# Patient Record
Sex: FEMALE | Race: WHITE | Employment: OTHER | URBAN - METROPOLITAN AREA
[De-identification: names, ages, dates, MRNs, and addresses within clinical notes are randomized per-mention and may not be internally consistent; named-entity substitution may affect disease eponyms.]

---

## 2017-06-22 ENCOUNTER — HOSPITAL ENCOUNTER (OUTPATIENT)
Age: 72
Setting detail: SPECIMEN
Discharge: HOME | End: 2017-06-22
Payer: MEDICARE

## 2017-06-22 ENCOUNTER — OFFICE VISIT (OUTPATIENT)
Dept: FAMILY MEDICINE CLINIC | Age: 72
End: 2017-06-22

## 2017-06-22 VITALS
BODY MASS INDEX: 29.71 KG/M2 | OXYGEN SATURATION: 94 % | HEART RATE: 88 BPM | WEIGHT: 174 LBS | SYSTOLIC BLOOD PRESSURE: 110 MMHG | DIASTOLIC BLOOD PRESSURE: 64 MMHG | HEIGHT: 64 IN

## 2017-06-22 DIAGNOSIS — Z00.00 ANNUAL PHYSICAL EXAM: Primary | ICD-10-CM

## 2017-06-22 DIAGNOSIS — E11.9 TYPE 2 DIABETES MELLITUS WITHOUT COMPLICATION, WITHOUT LONG-TERM CURRENT USE OF INSULIN (HCC): ICD-10-CM

## 2017-06-22 DIAGNOSIS — I10 ESSENTIAL HYPERTENSION: ICD-10-CM

## 2017-06-22 LAB
CREATININE URINE: 338.8 MG/DL
HBA1C MFR BLD: 6.8 %
MICROALBUMIN UR-MCNC: 47.4 MG/DL
MICROALBUMIN/CREAT UR-RTO: 139.9 MG/G (ref 0–30)

## 2017-06-22 PROCEDURE — 82043 UR ALBUMIN QUANTITATIVE: CPT

## 2017-06-22 PROCEDURE — 82570 ASSAY OF URINE CREATININE: CPT

## 2017-06-22 PROCEDURE — 99213 OFFICE O/P EST LOW 20 MIN: CPT | Performed by: FAMILY MEDICINE

## 2017-06-22 PROCEDURE — 83036 HEMOGLOBIN GLYCOSYLATED A1C: CPT | Performed by: FAMILY MEDICINE

## 2017-06-22 RX ORDER — TRAMADOL HYDROCHLORIDE 50 MG/1
50 TABLET ORAL EVERY 6 HOURS PRN
COMMUNITY
End: 2018-11-27

## 2017-06-22 RX ORDER — CARVEDILOL 12.5 MG/1
25 TABLET ORAL 2 TIMES DAILY
Refills: 2 | COMMUNITY
Start: 2017-04-28 | End: 2019-12-13

## 2017-06-22 RX ORDER — MONTELUKAST SODIUM 4 MG/1
TABLET, CHEWABLE ORAL
COMMUNITY
Start: 2017-06-19 | End: 2021-09-01

## 2017-06-22 ASSESSMENT — ENCOUNTER SYMPTOMS
SHORTNESS OF BREATH: 0
COUGH: 0
DIARRHEA: 0
WHEEZING: 0
CONSTIPATION: 0
RHINORRHEA: 0
SORE THROAT: 0
ABDOMINAL PAIN: 0

## 2017-06-22 ASSESSMENT — PATIENT HEALTH QUESTIONNAIRE - PHQ9
SUM OF ALL RESPONSES TO PHQ9 QUESTIONS 1 & 2: 0
1. LITTLE INTEREST OR PLEASURE IN DOING THINGS: 0
2. FEELING DOWN, DEPRESSED OR HOPELESS: 0
SUM OF ALL RESPONSES TO PHQ QUESTIONS 1-9: 0

## 2017-06-23 ENCOUNTER — TELEPHONE (OUTPATIENT)
Dept: FAMILY MEDICINE CLINIC | Age: 72
End: 2017-06-23

## 2017-06-23 DIAGNOSIS — R80.9 TYPE 2 DIABETES MELLITUS WITH MICROALBUMINURIA, WITHOUT LONG-TERM CURRENT USE OF INSULIN (HCC): ICD-10-CM

## 2017-06-23 DIAGNOSIS — Z12.39 BREAST SCREENING: Primary | ICD-10-CM

## 2017-06-23 DIAGNOSIS — E11.29 TYPE 2 DIABETES MELLITUS WITH MICROALBUMINURIA, WITHOUT LONG-TERM CURRENT USE OF INSULIN (HCC): ICD-10-CM

## 2017-06-28 ENCOUNTER — OFFICE VISIT (OUTPATIENT)
Dept: FAMILY MEDICINE CLINIC | Age: 72
End: 2017-06-28

## 2017-06-28 ENCOUNTER — HOSPITAL ENCOUNTER (OUTPATIENT)
Age: 72
Setting detail: SPECIMEN
Discharge: HOME | End: 2017-06-28
Payer: COMMERCIAL

## 2017-06-28 VITALS
BODY MASS INDEX: 29.7 KG/M2 | DIASTOLIC BLOOD PRESSURE: 78 MMHG | RESPIRATION RATE: 14 BRPM | SYSTOLIC BLOOD PRESSURE: 140 MMHG | HEART RATE: 80 BPM | WEIGHT: 173 LBS

## 2017-06-28 DIAGNOSIS — R80.9 MICROALBUMINURIA: ICD-10-CM

## 2017-06-28 DIAGNOSIS — N30.00 ACUTE CYSTITIS WITHOUT HEMATURIA: Primary | ICD-10-CM

## 2017-06-28 DIAGNOSIS — N30.00 ACUTE CYSTITIS WITHOUT HEMATURIA: ICD-10-CM

## 2017-06-28 LAB
BILIRUBIN, POC: ABNORMAL
BLOOD URINE, POC: ABNORMAL
CLARITY, POC: ABNORMAL
COLOR, POC: ABNORMAL
GLUCOSE URINE, POC: ABNORMAL
KETONES, POC: ABNORMAL
LEUKOCYTE EST, POC: ABNORMAL
NITRITE, POC: ABNORMAL
PH, POC: 5.5
PROTEIN, POC: ABNORMAL
SPECIFIC GRAVITY, POC: 1.03
UROBILINOGEN, POC: ABNORMAL

## 2017-06-28 PROCEDURE — 87086 URINE CULTURE/COLONY COUNT: CPT

## 2017-06-28 PROCEDURE — 81002 URINALYSIS NONAUTO W/O SCOPE: CPT | Performed by: PHYSICIAN ASSISTANT

## 2017-06-28 PROCEDURE — 99213 OFFICE O/P EST LOW 20 MIN: CPT | Performed by: PHYSICIAN ASSISTANT

## 2017-06-28 RX ORDER — CIPROFLOXACIN 250 MG/1
250 TABLET, FILM COATED ORAL 2 TIMES DAILY
Qty: 10 TABLET | Refills: 0 | Status: SHIPPED | OUTPATIENT
Start: 2017-06-28 | End: 2017-07-03

## 2017-06-28 RX ORDER — LISINOPRIL 2.5 MG/1
2.5 TABLET ORAL DAILY
Qty: 30 TABLET | Refills: 3 | Status: SHIPPED | OUTPATIENT
Start: 2017-06-28 | End: 2017-12-06 | Stop reason: ALTCHOICE

## 2017-06-30 LAB — URINE CULTURE, ROUTINE: NORMAL

## 2017-07-10 ENCOUNTER — HOSPITAL ENCOUNTER (OUTPATIENT)
Dept: WOMENS IMAGING | Age: 72
Discharge: HOME | End: 2017-07-10
Payer: MEDICARE

## 2017-07-10 DIAGNOSIS — Z12.39 BREAST SCREENING: ICD-10-CM

## 2017-07-10 PROCEDURE — G0202 SCR MAMMO BI INCL CAD: HCPCS

## 2017-07-31 DIAGNOSIS — Z01.89 ROUTINE LAB DRAW: Primary | ICD-10-CM

## 2017-07-31 LAB
AVERAGE GLUCOSE: NORMAL
CHOLESTEROL, TOTAL: 238 MG/DL
CHOLESTEROL/HDL RATIO: NORMAL
HBA1C MFR BLD: 6.9 %
HDLC SERPL-MCNC: 39 MG/DL (ref 35–70)
LDL CHOLESTEROL CALCULATED: NORMAL MG/DL (ref 0–160)
TRIGL SERPL-MCNC: 684 MG/DL
VLDLC SERPL CALC-MCNC: NORMAL MG/DL

## 2017-08-17 ENCOUNTER — OFFICE VISIT (OUTPATIENT)
Dept: FAMILY MEDICINE CLINIC | Age: 72
End: 2017-08-17

## 2017-08-17 VITALS
OXYGEN SATURATION: 94 % | DIASTOLIC BLOOD PRESSURE: 72 MMHG | HEART RATE: 83 BPM | TEMPERATURE: 98 F | SYSTOLIC BLOOD PRESSURE: 136 MMHG | WEIGHT: 173.2 LBS | BODY MASS INDEX: 29.73 KG/M2

## 2017-08-17 DIAGNOSIS — E11.29 TYPE 2 DIABETES MELLITUS WITH MICROALBUMINURIA, WITHOUT LONG-TERM CURRENT USE OF INSULIN (HCC): Primary | ICD-10-CM

## 2017-08-17 DIAGNOSIS — R80.9 TYPE 2 DIABETES MELLITUS WITH MICROALBUMINURIA, WITHOUT LONG-TERM CURRENT USE OF INSULIN (HCC): Primary | ICD-10-CM

## 2017-08-17 PROCEDURE — 99213 OFFICE O/P EST LOW 20 MIN: CPT | Performed by: FAMILY MEDICINE

## 2017-08-25 DIAGNOSIS — Z01.89 ROUTINE LAB DRAW: Primary | ICD-10-CM

## 2017-08-25 LAB
BUN BLDV-MCNC: 22 MG/DL
CALCIUM SERPL-MCNC: 9.3 MG/DL
CHLORIDE BLD-SCNC: 99 MMOL/L
CO2: 24 MMOL/L
CREAT SERPL-MCNC: 0.88 MG/DL
GFR CALCULATED: 65
GLUCOSE BLD-MCNC: 124 MG/DL
POTASSIUM SERPL-SCNC: 4.5 MMOL/L
SODIUM BLD-SCNC: 141 MMOL/L

## 2017-08-26 ASSESSMENT — ENCOUNTER SYMPTOMS
ABDOMINAL PAIN: 0
COUGH: 0
SORE THROAT: 0
WHEEZING: 0
DIARRHEA: 0
SHORTNESS OF BREATH: 0
CONSTIPATION: 0
RHINORRHEA: 0

## 2017-09-27 ENCOUNTER — NURSE ONLY (OUTPATIENT)
Dept: FAMILY MEDICINE CLINIC | Age: 72
End: 2017-09-27

## 2017-09-27 DIAGNOSIS — Z23 NEED FOR INFLUENZA VACCINATION: Primary | ICD-10-CM

## 2017-09-27 PROCEDURE — G0008 ADMIN INFLUENZA VIRUS VAC: HCPCS | Performed by: FAMILY MEDICINE

## 2017-09-27 PROCEDURE — 90662 IIV NO PRSV INCREASED AG IM: CPT | Performed by: FAMILY MEDICINE

## 2017-11-06 DIAGNOSIS — F51.01 PRIMARY INSOMNIA: ICD-10-CM

## 2017-11-07 RX ORDER — TRAZODONE HYDROCHLORIDE 100 MG/1
TABLET ORAL
Qty: 30 TABLET | Refills: 11 | Status: SHIPPED | OUTPATIENT
Start: 2017-11-07 | End: 2018-12-08 | Stop reason: SDUPTHER

## 2017-12-06 ENCOUNTER — OFFICE VISIT (OUTPATIENT)
Dept: FAMILY MEDICINE CLINIC | Age: 72
End: 2017-12-06

## 2017-12-06 ENCOUNTER — HOSPITAL ENCOUNTER (OUTPATIENT)
Age: 72
Setting detail: SPECIMEN
Discharge: HOME | End: 2017-12-06
Payer: MEDICARE

## 2017-12-06 ENCOUNTER — TELEPHONE (OUTPATIENT)
Dept: FAMILY MEDICINE CLINIC | Age: 72
End: 2017-12-06

## 2017-12-06 VITALS
BODY MASS INDEX: 30.9 KG/M2 | TEMPERATURE: 97.8 F | HEART RATE: 80 BPM | SYSTOLIC BLOOD PRESSURE: 134 MMHG | WEIGHT: 180 LBS | DIASTOLIC BLOOD PRESSURE: 74 MMHG | OXYGEN SATURATION: 94 %

## 2017-12-06 DIAGNOSIS — E11.29 TYPE 2 DIABETES MELLITUS WITH MICROALBUMINURIA, WITHOUT LONG-TERM CURRENT USE OF INSULIN (HCC): Primary | ICD-10-CM

## 2017-12-06 DIAGNOSIS — E11.29 TYPE 2 DIABETES MELLITUS WITH MICROALBUMINURIA, WITHOUT LONG-TERM CURRENT USE OF INSULIN (HCC): ICD-10-CM

## 2017-12-06 DIAGNOSIS — R80.9 TYPE 2 DIABETES MELLITUS WITH MICROALBUMINURIA, WITHOUT LONG-TERM CURRENT USE OF INSULIN (HCC): ICD-10-CM

## 2017-12-06 DIAGNOSIS — I10 ESSENTIAL HYPERTENSION: ICD-10-CM

## 2017-12-06 DIAGNOSIS — R80.9 TYPE 2 DIABETES MELLITUS WITH MICROALBUMINURIA, WITHOUT LONG-TERM CURRENT USE OF INSULIN (HCC): Primary | ICD-10-CM

## 2017-12-06 LAB
CREATININE URINE: 278.1 MG/DL
HBA1C MFR BLD: 6.5 %
MICROALBUMIN UR-MCNC: 73 MG/DL
MICROALBUMIN/CREAT UR-RTO: 262.5 MG/G (ref 0–30)

## 2017-12-06 PROCEDURE — 82570 ASSAY OF URINE CREATININE: CPT

## 2017-12-06 PROCEDURE — 83036 HEMOGLOBIN GLYCOSYLATED A1C: CPT | Performed by: FAMILY MEDICINE

## 2017-12-06 PROCEDURE — 99213 OFFICE O/P EST LOW 20 MIN: CPT | Performed by: FAMILY MEDICINE

## 2017-12-06 PROCEDURE — 82043 UR ALBUMIN QUANTITATIVE: CPT

## 2017-12-06 RX ORDER — LISINOPRIL 20 MG/1
TABLET ORAL
Refills: 3 | COMMUNITY
Start: 2017-11-07 | End: 2022-03-21 | Stop reason: DRUGHIGH

## 2017-12-06 RX ORDER — GLUCOSAMINE HCL/CHONDROITIN SU 500-400 MG
CAPSULE ORAL
Qty: 100 STRIP | Refills: 3 | Status: SHIPPED | OUTPATIENT
Start: 2017-12-06 | End: 2020-03-10 | Stop reason: SDUPTHER

## 2017-12-06 RX ORDER — SYRING-NEEDL,DISP,INSUL,0.3 ML 30 GX5/16"
1 SYRINGE, EMPTY DISPOSABLE MISCELLANEOUS 3 TIMES DAILY
Qty: 100 EACH | Refills: 5 | Status: SHIPPED | OUTPATIENT
Start: 2017-12-06

## 2017-12-06 RX ORDER — LANCETS 30 GAUGE
EACH MISCELLANEOUS
Qty: 100 EACH | Refills: 3 | Status: SHIPPED | OUTPATIENT
Start: 2017-12-06 | End: 2021-03-26

## 2017-12-06 ASSESSMENT — ENCOUNTER SYMPTOMS
WHEEZING: 0
CONSTIPATION: 0
SORE THROAT: 0
RHINORRHEA: 0
DIARRHEA: 0
COUGH: 0
ABDOMINAL PAIN: 0
SHORTNESS OF BREATH: 0

## 2017-12-06 NOTE — PROGRESS NOTES
[address] [place] [address] PRIMARY CARE [address] [id number]  Dept: [phone]  Dept Fax: [phone]  Loc: [phone]   Chief Complaint  Chief Complaint   Patient presents with    Diabetes     follow up, pt would like to have another micro done, last one was 06/2017       HPI:  74 y.o. female who presents for DM2:    DM2: a1c 6.5 from 6.9. Compliant with diet. Diet controlled diabetes. HTN: was taken off the amlodipine due to foot swelling and meds being adjusted by her cardiologist.     Past Medical History:   Diagnosis Date    Acid reflux     Arthritis     Hyperlipidemia     Hypertension     Spinal stenosis     Type II or unspecified type diabetes mellitus without mention of complication, not stated as uncontrolled      Past Surgical History:   Procedure Laterality Date    CHOLECYSTECTOMY      COLON SURGERY  2010    bowel reduction    EYE SURGERY Right     laser surgery tore retina     KNEE SURGERY Right 09/2013    torn tyresecus & baker cyst burst    LUMBAR LAMINECTOMY       Social History     Social History    Marital status:      Spouse name: N/A    Number of children: N/A    Years of education: N/A     Occupational History    Not on file. Social History Main Topics    Smoking status: Never Smoker    Smokeless tobacco: Never Used    Alcohol use Yes      Comment: rare    Drug use: No    Sexual activity: Yes     Other Topics Concern    Not on file     Social History Narrative    No narrative on file     Allergies   Allergen Reactions    Amoxicillin-Pot Clavulanate     Flagyl [Metronidazole]     Sulfa Antibiotics      Current Outpatient Prescriptions   Medication Sig Dispense Refill    lisinopril (PRINIVIL;ZESTRIL) 20 MG tablet take 1 tablet by mouth twice daily  3    Lancet Device MISC 1 Device by Does not apply route 3 times daily Test 4x/day.  100 each 5    Blood Glucose Monitoring Suppl DONTRELL

## 2018-03-15 ENCOUNTER — TELEPHONE (OUTPATIENT)
Dept: FAMILY MEDICINE CLINIC | Age: 73
End: 2018-03-15

## 2018-03-15 ENCOUNTER — OFFICE VISIT (OUTPATIENT)
Dept: FAMILY MEDICINE CLINIC | Age: 73
End: 2018-03-15
Payer: MEDICARE

## 2018-03-15 VITALS
BODY MASS INDEX: 31.1 KG/M2 | TEMPERATURE: 98.3 F | SYSTOLIC BLOOD PRESSURE: 136 MMHG | HEART RATE: 75 BPM | DIASTOLIC BLOOD PRESSURE: 78 MMHG | OXYGEN SATURATION: 96 % | HEIGHT: 64 IN | WEIGHT: 182.2 LBS

## 2018-03-15 DIAGNOSIS — E11.29 TYPE 2 DIABETES MELLITUS WITH MICROALBUMINURIA, WITHOUT LONG-TERM CURRENT USE OF INSULIN (HCC): ICD-10-CM

## 2018-03-15 DIAGNOSIS — R39.15 URGENCY OF URINATION: ICD-10-CM

## 2018-03-15 DIAGNOSIS — R80.9 TYPE 2 DIABETES MELLITUS WITH MICROALBUMINURIA, WITHOUT LONG-TERM CURRENT USE OF INSULIN (HCC): ICD-10-CM

## 2018-03-15 DIAGNOSIS — H57.89 EYE SWELLING, RIGHT: Primary | ICD-10-CM

## 2018-03-15 DIAGNOSIS — I10 ESSENTIAL HYPERTENSION: ICD-10-CM

## 2018-03-15 LAB
APPEARANCE FLUID: ABNORMAL
BILIRUBIN, POC: ABNORMAL
BLOOD URINE, POC: ABNORMAL
CLARITY, POC: ABNORMAL
COLOR, POC: YELLOW
GLUCOSE URINE, POC: ABNORMAL
KETONES, POC: ABNORMAL
LEUKOCYTE EST, POC: ABNORMAL
NITRITE, POC: ABNORMAL
PH, POC: 5.5
PROTEIN, POC: ABNORMAL
SPECIFIC GRAVITY, POC: 1.03
UROBILINOGEN, POC: ABNORMAL

## 2018-03-15 PROCEDURE — 99214 OFFICE O/P EST MOD 30 MIN: CPT | Performed by: FAMILY MEDICINE

## 2018-03-15 RX ORDER — AMLODIPINE BESYLATE 5 MG/1
TABLET ORAL
Refills: 3 | COMMUNITY
Start: 2018-03-01 | End: 2018-03-15 | Stop reason: DRUGHIGH

## 2018-03-15 RX ORDER — NITROFURANTOIN 25; 75 MG/1; MG/1
100 CAPSULE ORAL 2 TIMES DAILY
Qty: 7 CAPSULE | Refills: 0 | Status: SHIPPED | OUTPATIENT
Start: 2018-03-15 | End: 2018-06-16 | Stop reason: SDUPTHER

## 2018-03-15 ASSESSMENT — ENCOUNTER SYMPTOMS
CONSTIPATION: 0
ABDOMINAL PAIN: 0
SHORTNESS OF BREATH: 0
COUGH: 0
DIARRHEA: 0
RHINORRHEA: 0
WHEEZING: 0
SORE THROAT: 0

## 2018-03-15 NOTE — PROGRESS NOTES
on file     Social History Narrative    No narrative on file     Allergies   Allergen Reactions    Amoxicillin-Pot Clavulanate     Flagyl [Metronidazole]     Sulfa Antibiotics      Current Outpatient Prescriptions   Medication Sig Dispense Refill    nitrofurantoin, macrocrystal-monohydrate, (MACROBID) 100 MG capsule Take 1 capsule by mouth 2 times daily for 7 days 7 capsule 0    lisinopril (PRINIVIL;ZESTRIL) 20 MG tablet take 1 tablet by mouth twice daily  3    Lancet Device MISC 1 Device by Does not apply route 3 times daily Test 4x/day. 100 each 5    Blood Glucose Monitoring Suppl DONTRELL Use TID; also needs supplies for machine 1 Device 0    Lancets MISC  each 3    Glucose Blood (BLOOD GLUCOSE TEST STRIPS) STRP  strip 3    traZODone (DESYREL) 100 MG tablet Take 1 tablet by mouth nightly 30 tablet 11    carvedilol (COREG) 12.5 MG tablet 25 mg 2 times daily   2    colestipol (COLESTID) 1 g tablet       traMADol (ULTRAM) 50 MG tablet Take 50 mg by mouth every 6 hours as needed for Pain      omeprazole (PRILOSEC) 20 MG capsule Take 20 mg by mouth daily.  CO ENZYME Q-10 PO Take  by mouth.  Cyanocobalamin (VITAMIN B-12 CR) 1000 MCG TBCR Take  by mouth.  amLODIPine (NORVASC) 10 MG tablet Take 10 mg by mouth 2 times daily       aspirin 81 MG tablet Take 81 mg by mouth daily.  Calcium Carbonate-Vitamin D (CALCIUM + D) 600-200 MG-UNIT TABS Take  by mouth.  pravastatin (PRAVACHOL) 40 MG tablet Take 40 mg by mouth daily. No current facility-administered medications for this visit. ROS:  Review of Systems   Constitutional: Negative for chills and fever. HENT: Negative for rhinorrhea and sore throat. Respiratory: Negative for cough, shortness of breath and wheezing. Gastrointestinal: Negative for abdominal pain, constipation and diarrhea. Endocrine: Negative for polydipsia and polyuria. Genitourinary: Negative for dysuria, frequency and urgency. continue the antihistamine eye drops. - HTN: at goal  - Urine urgency: UA suggestive of UTI; macrobid    1. Eye swelling, right     2. Urgency of urination  POCT Urinalysis no Micro    nitrofurantoin, macrocrystal-monohydrate, (MACROBID) 100 MG capsule   3. Type 2 diabetes mellitus with microalbuminuria, without long-term current use of insulin ([address]     4. Essential hypertension          Return if symptoms worsen or fail to improve.     Thea Beck MD

## 2018-03-21 ENCOUNTER — TELEPHONE (OUTPATIENT)
Dept: FAMILY MEDICINE CLINIC | Age: 73
End: 2018-03-21

## 2018-03-21 RX ORDER — METHYLPREDNISOLONE 4 MG/1
TABLET ORAL
Qty: 1 KIT | Refills: 0 | Status: SHIPPED | OUTPATIENT
Start: 2018-03-21 | End: 2018-03-27

## 2018-06-08 ENCOUNTER — TELEPHONE (OUTPATIENT)
Dept: FAMILY MEDICINE CLINIC | Age: 73
End: 2018-06-08

## 2018-06-08 DIAGNOSIS — R80.9 TYPE 2 DIABETES MELLITUS WITH MICROALBUMINURIA, WITHOUT LONG-TERM CURRENT USE OF INSULIN (HCC): Primary | ICD-10-CM

## 2018-06-08 DIAGNOSIS — E11.29 TYPE 2 DIABETES MELLITUS WITH MICROALBUMINURIA, WITHOUT LONG-TERM CURRENT USE OF INSULIN (HCC): Primary | ICD-10-CM

## 2018-06-08 LAB
AVERAGE GLUCOSE: 157
HBA1C MFR BLD: 7.1 %

## 2018-06-16 ENCOUNTER — HOSPITAL ENCOUNTER (OUTPATIENT)
Age: 73
Setting detail: SPECIMEN
Discharge: HOME | End: 2018-06-16
Payer: MEDICARE

## 2018-06-16 ENCOUNTER — OFFICE VISIT (OUTPATIENT)
Dept: FAMILY MEDICINE CLINIC | Age: 73
End: 2018-06-16
Payer: MEDICARE

## 2018-06-16 VITALS
TEMPERATURE: 98.1 F | HEART RATE: 90 BPM | BODY MASS INDEX: 30.56 KG/M2 | WEIGHT: 179 LBS | SYSTOLIC BLOOD PRESSURE: 130 MMHG | OXYGEN SATURATION: 96 % | HEIGHT: 64 IN | DIASTOLIC BLOOD PRESSURE: 77 MMHG | RESPIRATION RATE: 16 BRPM

## 2018-06-16 DIAGNOSIS — R30.0 DYSURIA: ICD-10-CM

## 2018-06-16 DIAGNOSIS — N30.00 ACUTE CYSTITIS WITHOUT HEMATURIA: Primary | ICD-10-CM

## 2018-06-16 LAB
BILIRUBIN, POC: ABNORMAL
BLOOD URINE, POC: ABNORMAL
CLARITY, POC: ABNORMAL
COLOR, POC: YELLOW
GLUCOSE URINE, POC: ABNORMAL
KETONES, POC: ABNORMAL
LEUKOCYTE EST, POC: ABNORMAL
NITRITE, POC: ABNORMAL
PH, POC: 6
PROTEIN, POC: ABNORMAL
SPECIFIC GRAVITY, POC: 1.03
UROBILINOGEN, POC: 3.5

## 2018-06-16 PROCEDURE — 87077 CULTURE AEROBIC IDENTIFY: CPT

## 2018-06-16 PROCEDURE — 87186 SC STD MICRODIL/AGAR DIL: CPT

## 2018-06-16 PROCEDURE — 81003 URINALYSIS AUTO W/O SCOPE: CPT | Performed by: NURSE PRACTITIONER

## 2018-06-16 PROCEDURE — 99213 OFFICE O/P EST LOW 20 MIN: CPT | Performed by: NURSE PRACTITIONER

## 2018-06-16 PROCEDURE — 87086 URINE CULTURE/COLONY COUNT: CPT

## 2018-06-16 RX ORDER — NITROFURANTOIN 25; 75 MG/1; MG/1
100 CAPSULE ORAL 2 TIMES DAILY
Qty: 7 CAPSULE | Refills: 0 | Status: SHIPPED | OUTPATIENT
Start: 2018-06-16 | End: 2018-06-23

## 2018-06-16 ASSESSMENT — ENCOUNTER SYMPTOMS
SHORTNESS OF BREATH: 0
WHEEZING: 0
VOMITING: 0
COUGH: 0
DIARRHEA: 0
NAUSEA: 0
ABDOMINAL PAIN: 0

## 2018-06-18 LAB
ORGANISM: ABNORMAL
URINE CULTURE, ROUTINE: ABNORMAL
URINE CULTURE, ROUTINE: ABNORMAL

## 2018-06-22 ENCOUNTER — TELEPHONE (OUTPATIENT)
Dept: INTERNAL MEDICINE CLINIC | Age: 73
End: 2018-06-22

## 2018-07-13 ENCOUNTER — OFFICE VISIT (OUTPATIENT)
Dept: FAMILY MEDICINE CLINIC | Age: 73
End: 2018-07-13
Payer: MEDICARE

## 2018-07-13 VITALS
BODY MASS INDEX: 30.04 KG/M2 | SYSTOLIC BLOOD PRESSURE: 132 MMHG | OXYGEN SATURATION: 96 % | DIASTOLIC BLOOD PRESSURE: 72 MMHG | WEIGHT: 175 LBS | HEART RATE: 70 BPM

## 2018-07-13 DIAGNOSIS — R53.83 FATIGUE, UNSPECIFIED TYPE: ICD-10-CM

## 2018-07-13 DIAGNOSIS — Z91.81 AT HIGH RISK FOR FALLS: ICD-10-CM

## 2018-07-13 DIAGNOSIS — R42 DIZZINESS: Primary | ICD-10-CM

## 2018-07-13 PROCEDURE — 99213 OFFICE O/P EST LOW 20 MIN: CPT | Performed by: FAMILY MEDICINE

## 2018-07-13 ASSESSMENT — ENCOUNTER SYMPTOMS
DIARRHEA: 0
WHEEZING: 0
RHINORRHEA: 0
SHORTNESS OF BREATH: 0
CONSTIPATION: 0
SORE THROAT: 0
ABDOMINAL PAIN: 0
COUGH: 0

## 2018-07-13 ASSESSMENT — PATIENT HEALTH QUESTIONNAIRE - PHQ9
SUM OF ALL RESPONSES TO PHQ QUESTIONS 1-9: 0
2. FEELING DOWN, DEPRESSED OR HOPELESS: 0
1. LITTLE INTEREST OR PLEASURE IN DOING THINGS: 0
SUM OF ALL RESPONSES TO PHQ9 QUESTIONS 1 & 2: 0

## 2018-07-13 NOTE — PROGRESS NOTES
[address] [facility] [address] [facility] PRIMARY CARE  [facility] [place] [address]  Dept: [phone]  Dept Fax: : [phone]   Chief Complaint  Chief Complaint   Patient presents with    Dizziness     x2weeks    Other     Feels \"Clamy\"    [facility]Up from [facility]        HPI:  74 y.o. female who presents for dizziness:    LUE pain: tore rotator cuff and had shulder fracture; just had surgery 6/11; this was following a fall onto concrete while watering plants; Wearing a sling and going to rehab. Seen in ED last Saturday after feeling \"clammy\"; In the ED had CT head and CXR with no acute changes. No ACS event found; Seen by her cardiologist 7/11 with plans for labs, echo, stress test, holter. Her carvedilol was also increased to 37.5 BID. Was told she was dehydrated. Initially had felt weak, dizzy; no CP or SOB or nausea. No visual changes. Since that time has felt sleepy, fatigue, nausea, decreased appetite. She is no longer taking percocet for the LUE pain. Had IBS with intermittent diarrhea. Past Medical History:   Diagnosis Date    Acid reflux     Arthritis     Hyperlipidemia     Hypertension     Spinal stenosis     Type II or unspecified type diabetes mellitus without mention of complication, not stated as uncontrolled      Past Surgical History:   Procedure Laterality Date    CHOLECYSTECTOMY      COLON SURGERY  2010    bowel reduction    EYE SURGERY Right     laser surgery tore retina     KNEE SURGERY Right 09/2013    torn menescus & baker cyst burst    LUMBAR LAMINECTOMY       Social History     Social History    Marital status:      Spouse name: N/A    Number of children: N/A    Years of education: N/A     Occupational History    Not on file.      Social History Main Topics    Smoking status: Never Smoker    Smokeless tobacco: Never Used    Alcohol use Yes Neurological: Positive for dizziness. Negative for syncope, light-headedness, numbness and headaches. Psychiatric/Behavioral: Negative for sleep disturbance. The patient is not nervous/anxious. Vitals:    07/13/18 1054   BP: 132/72   Site: Right Arm   Position: Sitting   Cuff Size: Medium Adult   Pulse: 70   SpO2: 96%   Weight: 175 lb (79.4 kg)       Physical exam:  Physical Exam   Constitutional: She is oriented to person, place, and time. She appears well-developed and well-nourished. No distress. HENT:   Head: Normocephalic and atraumatic. Right Ear: Tympanic membrane, external ear and ear canal normal. Tympanic membrane is not erythematous. Tympanic membrane mobility is normal.   Left Ear: Tympanic membrane, external ear and ear canal normal. Tympanic membrane is not erythematous. Tympanic membrane mobility is normal.   Nose: Nose normal.   Mouth/Throat: Oropharynx is clear and moist. No oropharyngeal exudate. Eyes: EOM are normal.   Neck: Normal range of motion. No thyromegaly present. Cardiovascular: Normal rate, regular rhythm and normal heart sounds. No murmur heard. Pulmonary/Chest: Effort normal and breath sounds normal. No respiratory distress. She has no wheezes. Abdominal: Soft. Bowel sounds are normal. She exhibits no distension. There is no tenderness. There is no rebound and no guarding. Musculoskeletal: She exhibits no edema. Lymphadenopathy:     She has no cervical adenopathy. Neurological: She is alert and oriented to person, place, and time. Skin: Skin is warm and dry. Psychiatric: She has a normal mood and affect. Her behavior is normal.   Vitals reviewed. Assessment/Plan:  74 y.o. female here mainly for dizziness:  - benign exam; will need to complete her w/u with cardiology; Can discuss again later if symptoms persist. DM2 has been well controlled. For now, encouraged hydration. Diagnosis Orders   1. Dizziness     2. At high risk for falls     3. Fatigue, unspecified type          Return if symptoms worsen or fail to improve. Jagdish Hall MD     On the basis of positive falls risk screening, assessment and plan is as follows: work up already in progress.

## 2018-09-27 ENCOUNTER — OFFICE VISIT (OUTPATIENT)
Dept: FAMILY MEDICINE CLINIC | Age: 73
End: 2018-09-27
Payer: MEDICARE

## 2018-09-27 VITALS
OXYGEN SATURATION: 97 % | HEART RATE: 78 BPM | HEIGHT: 63 IN | TEMPERATURE: 98.5 F | WEIGHT: 178.6 LBS | DIASTOLIC BLOOD PRESSURE: 70 MMHG | BODY MASS INDEX: 31.64 KG/M2 | SYSTOLIC BLOOD PRESSURE: 122 MMHG

## 2018-09-27 DIAGNOSIS — E11.29 TYPE 2 DIABETES MELLITUS WITH MICROALBUMINURIA, WITHOUT LONG-TERM CURRENT USE OF INSULIN (HCC): ICD-10-CM

## 2018-09-27 DIAGNOSIS — Z23 ENCOUNTER FOR VACCINATION: ICD-10-CM

## 2018-09-27 DIAGNOSIS — Z23 NEED FOR INFLUENZA VACCINATION: ICD-10-CM

## 2018-09-27 DIAGNOSIS — R80.9 TYPE 2 DIABETES MELLITUS WITH MICROALBUMINURIA, WITHOUT LONG-TERM CURRENT USE OF INSULIN (HCC): ICD-10-CM

## 2018-09-27 DIAGNOSIS — Z00.00 ROUTINE GENERAL MEDICAL EXAMINATION AT A HEALTH CARE FACILITY: Primary | ICD-10-CM

## 2018-09-27 LAB — HBA1C MFR BLD: 6.9 %

## 2018-09-27 PROCEDURE — 83036 HEMOGLOBIN GLYCOSYLATED A1C: CPT | Performed by: FAMILY MEDICINE

## 2018-09-27 PROCEDURE — G0008 ADMIN INFLUENZA VIRUS VAC: HCPCS | Performed by: FAMILY MEDICINE

## 2018-09-27 PROCEDURE — 90662 IIV NO PRSV INCREASED AG IM: CPT | Performed by: FAMILY MEDICINE

## 2018-09-27 PROCEDURE — G0438 PPPS, INITIAL VISIT: HCPCS | Performed by: FAMILY MEDICINE

## 2018-09-27 RX ORDER — ACETAMINOPHEN,DIPHENHYDRAMINE HCL 500; 25 MG/1; MG/1
1 TABLET, FILM COATED ORAL
COMMUNITY
End: 2021-09-01 | Stop reason: ALTCHOICE

## 2018-09-27 ASSESSMENT — LIFESTYLE VARIABLES
HOW OFTEN DURING THE LAST YEAR HAVE YOU HAD A FEELING OF GUILT OR REMORSE AFTER DRINKING: 0
HOW MANY STANDARD DRINKS CONTAINING ALCOHOL DO YOU HAVE ON A TYPICAL DAY: 0
AUDIT TOTAL SCORE: 2
AUDIT-C TOTAL SCORE: 2
HOW OFTEN DURING THE LAST YEAR HAVE YOU BEEN UNABLE TO REMEMBER WHAT HAPPENED THE NIGHT BEFORE BECAUSE YOU HAD BEEN DRINKING: 0
HOW OFTEN DURING THE LAST YEAR HAVE YOU FOUND THAT YOU WERE NOT ABLE TO STOP DRINKING ONCE YOU HAD STARTED: 0
HOW OFTEN DURING THE LAST YEAR HAVE YOU FAILED TO DO WHAT WAS NORMALLY EXPECTED FROM YOU BECAUSE OF DRINKING: 0
HAVE YOU OR SOMEONE ELSE BEEN INJURED AS A RESULT OF YOUR DRINKING: 0
HAS A RELATIVE, FRIEND, DOCTOR, OR ANOTHER HEALTH PROFESSIONAL EXPRESSED CONCERN ABOUT YOUR DRINKING OR SUGGESTED YOU CUT DOWN: 0
HOW OFTEN DO YOU HAVE SIX OR MORE DRINKS ON ONE OCCASION: 0
HOW OFTEN DO YOU HAVE A DRINK CONTAINING ALCOHOL: 2
HOW OFTEN DURING THE LAST YEAR HAVE YOU NEEDED AN ALCOHOLIC DRINK FIRST THING IN THE MORNING TO GET YOURSELF GOING AFTER A NIGHT OF HEAVY DRINKING: 0

## 2018-09-27 ASSESSMENT — PATIENT HEALTH QUESTIONNAIRE - PHQ9
SUM OF ALL RESPONSES TO PHQ QUESTIONS 1-9: 0
SUM OF ALL RESPONSES TO PHQ QUESTIONS 1-9: 0

## 2018-09-27 ASSESSMENT — ANXIETY QUESTIONNAIRES: GAD7 TOTAL SCORE: 0

## 2018-09-27 NOTE — PROGRESS NOTES
Medicare Annual Wellness Visit  Name: Geena Chandler Date: 2018   MRN: [id number] Sex: Female   Age: 74 y.o. Ethnicity: Non-/Non    : 1945 Race: Yordy Jennings is here for Medicare AWV; Diabetes (follow up); and Immunizations (discuss flu vaccine, shingles vaccine )    Screenings for behavioral, psychosocial and functional/safety risks, and cognitive dysfunction are all negative except as indicated below. These results, as well as other patient data from the [address] form, are documented in Flowsheets linked to this Encounter. Allergies   Allergen Reactions    Augmentin [Amoxicillin-Pot Clavulanate] Nausea And Vomiting    Flagyl [Metronidazole]     Sulfa Antibiotics      Prior to Visit Medications    Medication Sig Taking? Authorizing Provider   lisinopril (PRINIVIL;ZESTRIL) 20 MG tablet take 1 tablet by mouth twice daily Yes Historical Provider, MD   Lancet Device MISC 1 Device by Does not apply route 3 times daily Test 4x/day. Yes Mili Finney MD   Blood Glucose Monitoring Suppl DONTRELL Use TID; also needs supplies for machine Yes Mili Finney MD   Lancets MISC TID Yes Mili Finney MD   Glucose Blood (BLOOD GLUCOSE TEST STRIPS) STRP TID Yes Mili Finney MD   traZODone (DESYREL) 100 MG tablet Take 1 tablet by mouth nightly  Patient taking differently: Take 0.5 tablet by mouth nightly Yes Mili Finney MD   carvedilol (COREG) 12.5 MG tablet 25 mg 2 times daily Taking 0.5 in the am and 1 tablet in the pm Yes Historical Provider, MD   traMADol (ULTRAM) 50 MG tablet Take 50 mg by mouth every 6 hours as needed for Pain Yes Historical Provider, MD   omeprazole (PRILOSEC) 20 MG capsule Take 20 mg by mouth daily. Yes Historical Provider, MD   CO ENZYME Q-10 PO Take  by mouth. Yes Historical Provider, MD   Cyanocobalamin (VITAMIN B-12 CR) 1000 MCG TBCR Take  by mouth.  Yes Historical Provider, MD   amLODIPine (NORVASC) 10 MG tablet Take 10 mg by mouth  Lipid screen  07/28/2018    Potassium monitoring  08/24/2018    Creatinine monitoring  08/24/2018    Flu vaccine (1) 09/01/2018    A1C test (Diabetic or Prediabetic)  06/07/2019    Breast cancer screen  07/10/2019    Colon cancer screen colonoscopy  05/22/2023    DEXA (modify frequency per FRAX score)  Addressed    Pneumococcal low/med risk  Completed     Recommendations for Preventive Services Due: see orders and patient instructions/AVS.  .   Recommended screening schedule for the next 5-10 years is provided to the patient in written form: see Patient Instructions/AVS.

## 2018-10-04 ENCOUNTER — HOSPITAL ENCOUNTER (OUTPATIENT)
Age: 73
Setting detail: SPECIMEN
Discharge: HOME | End: 2018-10-04
Payer: MEDICARE

## 2018-10-04 ENCOUNTER — NURSE ONLY (OUTPATIENT)
Dept: FAMILY MEDICINE CLINIC | Age: 73
End: 2018-10-04
Payer: MEDICARE

## 2018-10-04 DIAGNOSIS — R80.9 TYPE 2 DIABETES MELLITUS WITH MICROALBUMINURIA, WITHOUT LONG-TERM CURRENT USE OF INSULIN (HCC): Primary | ICD-10-CM

## 2018-10-04 DIAGNOSIS — Z00.00 ROUTINE GENERAL MEDICAL EXAMINATION AT A HEALTH CARE FACILITY: ICD-10-CM

## 2018-10-04 DIAGNOSIS — E11.29 TYPE 2 DIABETES MELLITUS WITH MICROALBUMINURIA, WITHOUT LONG-TERM CURRENT USE OF INSULIN (HCC): Primary | ICD-10-CM

## 2018-10-04 LAB
ANION GAP SERPL CALCULATED.3IONS-SCNC: 17 MEQ/L (ref 7–13)
BUN BLDV-MCNC: 21 MG/DL (ref 8–23)
CALCIUM SERPL-MCNC: 9.4 MG/DL (ref 8.6–10.2)
CHLORIDE BLD-SCNC: 99 MEQ/L (ref 98–107)
CHOLESTEROL, TOTAL: 201 MG/DL (ref 0–199)
CO2: 25 MEQ/L (ref 22–29)
CREAT SERPL-MCNC: 0.84 MG/DL (ref 0.5–0.9)
GFR AFRICAN AMERICAN: >60
GFR NON-AFRICAN AMERICAN: >60
GLUCOSE BLD-MCNC: 108 MG/DL (ref 74–109)
HDLC SERPL-MCNC: 45 MG/DL (ref 40–59)
LDL CHOLESTEROL CALCULATED: 90 MG/DL (ref 0–129)
POTASSIUM SERPL-SCNC: 4.4 MEQ/L (ref 3.5–5.1)
SODIUM BLD-SCNC: 141 MEQ/L (ref 132–144)
TRIGL SERPL-MCNC: 332 MG/DL (ref 0–200)

## 2018-10-04 PROCEDURE — 80061 LIPID PANEL: CPT

## 2018-10-04 PROCEDURE — 36415 COLL VENOUS BLD VENIPUNCTURE: CPT | Performed by: FAMILY MEDICINE

## 2018-10-04 PROCEDURE — 80048 BASIC METABOLIC PNL TOTAL CA: CPT

## 2018-11-27 ENCOUNTER — OFFICE VISIT (OUTPATIENT)
Dept: FAMILY MEDICINE CLINIC | Age: 73
End: 2018-11-27
Payer: MEDICARE

## 2018-11-27 ENCOUNTER — HOSPITAL ENCOUNTER (OUTPATIENT)
Age: 73
Setting detail: SPECIMEN
Discharge: HOME | End: 2018-11-27
Payer: MEDICARE

## 2018-11-27 VITALS
HEART RATE: 65 BPM | OXYGEN SATURATION: 98 % | WEIGHT: 181 LBS | BODY MASS INDEX: 30.9 KG/M2 | HEIGHT: 64 IN | DIASTOLIC BLOOD PRESSURE: 60 MMHG | SYSTOLIC BLOOD PRESSURE: 104 MMHG

## 2018-11-27 DIAGNOSIS — R80.9 TYPE 2 DIABETES MELLITUS WITH MICROALBUMINURIA, WITHOUT LONG-TERM CURRENT USE OF INSULIN (HCC): ICD-10-CM

## 2018-11-27 DIAGNOSIS — E11.29 TYPE 2 DIABETES MELLITUS WITH MICROALBUMINURIA, WITHOUT LONG-TERM CURRENT USE OF INSULIN (HCC): ICD-10-CM

## 2018-11-27 DIAGNOSIS — M48.00 SPINAL STENOSIS, UNSPECIFIED SPINAL REGION: Primary | ICD-10-CM

## 2018-11-27 DIAGNOSIS — Z23 ENCOUNTER FOR VACCINATION: ICD-10-CM

## 2018-11-27 LAB
CREATININE URINE: 537.4 MG/DL
MICROALBUMIN UR-MCNC: 39 MG/DL
MICROALBUMIN/CREAT UR-RTO: 72.6 MG/G (ref 0–30)

## 2018-11-27 PROCEDURE — 99214 OFFICE O/P EST MOD 30 MIN: CPT | Performed by: FAMILY MEDICINE

## 2018-11-27 PROCEDURE — 82570 ASSAY OF URINE CREATININE: CPT

## 2018-11-27 PROCEDURE — 82043 UR ALBUMIN QUANTITATIVE: CPT

## 2018-11-27 RX ORDER — TRAMADOL HYDROCHLORIDE 50 MG/1
50 TABLET ORAL DAILY PRN
Qty: 30 TABLET | Refills: 0 | Status: SHIPPED | OUTPATIENT
Start: 2018-11-27 | End: 2018-12-27

## 2018-11-27 RX ORDER — AMLODIPINE BESYLATE 5 MG/1
5 TABLET ORAL DAILY
Refills: 3 | COMMUNITY
Start: 2018-10-29 | End: 2021-10-14 | Stop reason: SDUPTHER

## 2018-11-27 ASSESSMENT — ENCOUNTER SYMPTOMS
WHEEZING: 0
CONSTIPATION: 0
DIARRHEA: 0
RHINORRHEA: 0
BACK PAIN: 1
ABDOMINAL PAIN: 0
SORE THROAT: 0
SHORTNESS OF BREATH: 0
COUGH: 0

## 2018-11-27 NOTE — PROGRESS NOTES
arthralgias and back pain. Neurological: Negative for syncope, light-headedness, numbness and headaches. Psychiatric/Behavioral: Negative for sleep disturbance. The patient is not nervous/anxious. Vitals:    11/27/18 1018   BP: 104/60   Pulse: 65   SpO2: 98%   Weight: 181 lb (82.1 kg)   Height: 5' 4\" (1.626 m)       Physical exam:  Physical Exam   Constitutional: She is oriented to person, place, and time. She appears well-developed and well-nourished. No distress. HENT:   Head: Normocephalic and atraumatic. Mouth/Throat: No oropharyngeal exudate. Eyes: EOM are normal.   Neck: Normal range of motion. Cardiovascular: Normal rate, regular rhythm and normal heart sounds. No murmur heard. Pulmonary/Chest: Effort normal and breath sounds normal. No respiratory distress. She has no wheezes. Abdominal: Soft. She exhibits no distension. There is no tenderness. There is no rebound and no guarding. Musculoskeletal: She exhibits no edema. Neurological: She is alert and oriented to person, place, and time. Skin: Skin is warm and dry. Psychiatric: She has a normal mood and affect. Her behavior is normal.   Vitals reviewed. Assessment/Plan:  74 y.o. female here mainly for back pain:  - Back pain: OARRS reviewed and appropriate. Provided limited tramadol for her trip. If needing daily then will need to see a pain clinic. Diagnosis Orders   1. Spinal stenosis, unspecified spinal region  traMADol (ULTRAM) 50 MG tablet   2. Type 2 diabetes mellitus with microalbuminuria, without long-term current use of insulin ([facility])  Microalbumin / Creatinine Urine Ratio   3. Encounter for vaccination          Return if symptoms worsen or fail to improve.     Nemesio Garcia MD

## 2018-12-08 DIAGNOSIS — F51.01 PRIMARY INSOMNIA: ICD-10-CM

## 2018-12-10 RX ORDER — TRAZODONE HYDROCHLORIDE 100 MG/1
TABLET ORAL
Qty: 30 TABLET | Refills: 11 | Status: SHIPPED | OUTPATIENT
Start: 2018-12-10 | End: 2018-12-24 | Stop reason: SDUPTHER

## 2018-12-11 ENCOUNTER — TELEPHONE (OUTPATIENT)
Dept: FAMILY MEDICINE CLINIC | Age: 73
End: 2018-12-11

## 2018-12-11 NOTE — TELEPHONE ENCOUNTER
Pt called requesting review of her microalbumin results, she says she hasn't heard anything from the office

## 2018-12-12 NOTE — TELEPHONE ENCOUNTER
It is elevated but improved from last year (looking for kidney damage related to DM2). You are already on the treatment for this which is lisinopril.

## 2018-12-24 DIAGNOSIS — F51.01 PRIMARY INSOMNIA: ICD-10-CM

## 2018-12-24 RX ORDER — TRAZODONE HYDROCHLORIDE 100 MG/1
TABLET ORAL
Qty: 30 TABLET | Refills: 11 | Status: SHIPPED | OUTPATIENT
Start: 2018-12-24 | End: 2019-12-13 | Stop reason: SDUPTHER

## 2019-02-27 ENCOUNTER — OFFICE VISIT (OUTPATIENT)
Dept: FAMILY MEDICINE CLINIC | Age: 74
End: 2019-02-27
Payer: MEDICARE

## 2019-02-27 VITALS
BODY MASS INDEX: 30.63 KG/M2 | RESPIRATION RATE: 14 BRPM | SYSTOLIC BLOOD PRESSURE: 130 MMHG | HEIGHT: 64 IN | DIASTOLIC BLOOD PRESSURE: 70 MMHG | WEIGHT: 179.4 LBS | TEMPERATURE: 97.9 F | HEART RATE: 93 BPM | OXYGEN SATURATION: 95 %

## 2019-02-27 DIAGNOSIS — R04.0 ANTERIOR EPISTAXIS: Primary | ICD-10-CM

## 2019-02-27 PROCEDURE — 30901 CONTROL OF NOSEBLEED: CPT | Performed by: NURSE PRACTITIONER

## 2019-02-27 PROCEDURE — 99213 OFFICE O/P EST LOW 20 MIN: CPT | Performed by: NURSE PRACTITIONER

## 2019-02-27 RX ORDER — CARVEDILOL 25 MG/1
TABLET ORAL
Refills: 3 | COMMUNITY
Start: 2018-12-22 | End: 2021-09-01 | Stop reason: ALTCHOICE

## 2019-02-27 ASSESSMENT — PATIENT HEALTH QUESTIONNAIRE - PHQ9
1. LITTLE INTEREST OR PLEASURE IN DOING THINGS: 0
SUM OF ALL RESPONSES TO PHQ QUESTIONS 1-9: 0
SUM OF ALL RESPONSES TO PHQ QUESTIONS 1-9: 0
2. FEELING DOWN, DEPRESSED OR HOPELESS: 0
SUM OF ALL RESPONSES TO PHQ9 QUESTIONS 1 & 2: 0

## 2019-02-27 ASSESSMENT — ENCOUNTER SYMPTOMS
RHINORRHEA: 1
RESPIRATORY NEGATIVE: 1

## 2019-04-04 ENCOUNTER — OFFICE VISIT (OUTPATIENT)
Dept: FAMILY MEDICINE CLINIC | Age: 74
End: 2019-04-04
Payer: MEDICARE

## 2019-04-04 VITALS
DIASTOLIC BLOOD PRESSURE: 78 MMHG | HEART RATE: 81 BPM | BODY MASS INDEX: 31.21 KG/M2 | WEIGHT: 181.8 LBS | OXYGEN SATURATION: 97 % | SYSTOLIC BLOOD PRESSURE: 126 MMHG

## 2019-04-04 DIAGNOSIS — M48.00 SPINAL STENOSIS, UNSPECIFIED SPINAL REGION: Primary | ICD-10-CM

## 2019-04-04 DIAGNOSIS — Z12.4 CERVICAL CANCER SCREENING: ICD-10-CM

## 2019-04-04 PROCEDURE — 99213 OFFICE O/P EST LOW 20 MIN: CPT | Performed by: FAMILY MEDICINE

## 2019-04-04 RX ORDER — METHYLPREDNISOLONE 4 MG/1
TABLET ORAL
Qty: 1 KIT | Refills: 0 | Status: SHIPPED | OUTPATIENT
Start: 2019-04-04 | End: 2019-04-10

## 2019-04-04 ASSESSMENT — ENCOUNTER SYMPTOMS
COUGH: 0
DIARRHEA: 0
SORE THROAT: 0
SHORTNESS OF BREATH: 0
CONSTIPATION: 0
WHEEZING: 0
BACK PAIN: 1
RHINORRHEA: 0
ABDOMINAL PAIN: 0

## 2019-04-04 NOTE — PROGRESS NOTES
[address] PRIMARY CARE  [address] [facility] [address]  Dept: [phone]  Dept Fax: : [phone]   Chief Complaint  Chief Complaint   Patient presents with    Discuss Medications     prednisone, pt is going to go to [place] and would like something for pain while on vacation        HPI:  74 y. o.female who presents for joint pain:    Back pain: hx of laminectomy with Dr. John Chang and told years ago that she would need a new surgery. She doesn't want any more back surgery right now. He gave her tramadol but she still has some leftover from her last refill. She is planning to travel to [place]. Currently the back and shoulder aren't bothering her much but she wants some available if she has pain while away. Leaves on 4/12/19. She is interested in getting a medrol dose pack while away.       Past Medical History:   Diagnosis Date    Acid reflux     Arthritis     Hyperlipidemia     Hypertension     Spinal stenosis     Type II or unspecified type diabetes mellitus without mention of complication, not stated as uncontrolled      Past Surgical History:   Procedure Laterality Date    CHOLECYSTECTOMY      COLON SURGERY  2010    bowel reduction    EYE SURGERY Right     laser surgery tore retina     KNEE SURGERY Right 09/2013    torn menescus & baker cyst burst    LUMBAR LAMINECTOMY       Social History     Socioeconomic History    Marital status:      Spouse name: Not on file    Number of children: Not on file    Years of education: Not on file    Highest education level: Not on file   Occupational History    Not on file   Social Needs    Financial resource strain: Not on file    Food insecurity:     Worry: Not on file     Inability: Not on file    Transportation needs:     Medical: Not on file     Non-medical: Not on file   Tobacco Use    Smoking status: Never Smoker    Smokeless tobacco: Never Used Substance and Sexual Activity    Alcohol use: Yes     Comment: rare    Drug use: No    Sexual activity: Yes   Lifestyle    Physical activity:     Days per week: Not on file     Minutes per session: Not on file    Stress: Not on file   Relationships    Social connections:     Talks on phone: Not on file     Gets together: Not on file     Attends Jew service: Not on file     Active member of club or organization: Not on file     Attends meetings of clubs or organizations: Not on file     Relationship status: Not on file    Intimate partner violence:     Fear of current or ex partner: Not on file     Emotionally abused: Not on file     Physically abused: Not on file     Forced sexual activity: Not on file   Other Topics Concern    Not on file   Social History Narrative    Not on file     Allergies   Allergen Reactions    Augmentin [Amoxicillin-Pot Clavulanate] Nausea And Vomiting    Flagyl [Metronidazole]     Sulfa Antibiotics      Current Outpatient Medications   Medication Sig Dispense Refill    methylPREDNISolone (MEDROL DOSEPACK) 4 MG tablet Take by mouth. 1 kit 0    carvedilol (COREG) 25 MG tablet TAKE 1 & 1/2 tablet by mouth TWICE DAILY  3    traZODone (DESYREL) 100 MG tablet Take 1 tablet by mouth nightly 30 tablet 11    amLODIPine (NORVASC) 5 MG tablet TAKE 1 TABLET BY MOUTH TWICE DAILY  3    diphenhydrAMINE-APAP, sleep, (TYLENOL PM EXTRA STRENGTH)  MG tablet Take 1 tablet by mouth      lisinopril (PRINIVIL;ZESTRIL) 20 MG tablet take 1 tablet by mouth twice daily  3    Lancet Device MISC 1 Device by Does not apply route 3 times daily Test 4x/day.  100 each 5    Blood Glucose Monitoring Suppl DONTRELL Use TID; also needs supplies for machine 1 Device 0    Lancets MISC  each 3    Glucose Blood (BLOOD GLUCOSE TEST STRIPS) STRP  strip 3    carvedilol (COREG) 12.5 MG tablet 25 mg 2 times daily Taking 0.5 in the am and 1 tablet in the pm  2    colestipol (COLESTID) 1 g needed  - She wants to see gyn for PAP. Diagnosis Orders   1. Spinal stenosis, unspecified spinal region  methylPREDNISolone (MEDROL DOSEPACK) 4 MG tablet   2. Cervical cancer screening  Ambulatory referral to Obstetrics / Gynecology        Return if symptoms worsen or fail to improve.     Amparo Appiah MD

## 2019-10-22 ENCOUNTER — NURSE ONLY (OUTPATIENT)
Dept: FAMILY MEDICINE CLINIC | Age: 74
End: 2019-10-22
Payer: MEDICARE

## 2019-10-22 DIAGNOSIS — Z23 NEED FOR IMMUNIZATION AGAINST INFLUENZA: Primary | ICD-10-CM

## 2019-10-22 PROCEDURE — 90653 IIV ADJUVANT VACCINE IM: CPT | Performed by: FAMILY MEDICINE

## 2019-10-22 PROCEDURE — G0008 ADMIN INFLUENZA VIRUS VAC: HCPCS | Performed by: FAMILY MEDICINE

## 2019-11-20 ENCOUNTER — TELEPHONE (OUTPATIENT)
Dept: ENDOCRINOLOGY | Age: 74
End: 2019-11-20

## 2019-12-13 ENCOUNTER — OFFICE VISIT (OUTPATIENT)
Dept: INTERNAL MEDICINE | Age: 74
End: 2019-12-13
Payer: MEDICARE

## 2019-12-13 VITALS
BODY MASS INDEX: 31.92 KG/M2 | OXYGEN SATURATION: 94 % | WEIGHT: 187 LBS | TEMPERATURE: 98.6 F | SYSTOLIC BLOOD PRESSURE: 138 MMHG | HEIGHT: 64 IN | DIASTOLIC BLOOD PRESSURE: 80 MMHG | HEART RATE: 73 BPM

## 2019-12-13 DIAGNOSIS — F51.01 PRIMARY INSOMNIA: ICD-10-CM

## 2019-12-13 DIAGNOSIS — R80.9 TYPE 2 DIABETES MELLITUS WITH MICROALBUMINURIA, WITHOUT LONG-TERM CURRENT USE OF INSULIN (HCC): ICD-10-CM

## 2019-12-13 DIAGNOSIS — Z00.00 ROUTINE GENERAL MEDICAL EXAMINATION AT A HEALTH CARE FACILITY: Primary | ICD-10-CM

## 2019-12-13 DIAGNOSIS — E11.29 TYPE 2 DIABETES MELLITUS WITH MICROALBUMINURIA, WITHOUT LONG-TERM CURRENT USE OF INSULIN (HCC): ICD-10-CM

## 2019-12-13 PROCEDURE — G0439 PPPS, SUBSEQ VISIT: HCPCS | Performed by: FAMILY MEDICINE

## 2019-12-13 RX ORDER — TRAZODONE HYDROCHLORIDE 100 MG/1
TABLET ORAL
Qty: 30 TABLET | Refills: 11 | Status: SHIPPED | OUTPATIENT
Start: 2019-12-13 | End: 2021-02-22

## 2019-12-13 ASSESSMENT — PATIENT HEALTH QUESTIONNAIRE - PHQ9
SUM OF ALL RESPONSES TO PHQ QUESTIONS 1-9: 0
SUM OF ALL RESPONSES TO PHQ QUESTIONS 1-9: 0

## 2019-12-19 DIAGNOSIS — R80.9 TYPE 2 DIABETES MELLITUS WITH MICROALBUMINURIA, WITHOUT LONG-TERM CURRENT USE OF INSULIN (HCC): ICD-10-CM

## 2019-12-19 DIAGNOSIS — E11.29 TYPE 2 DIABETES MELLITUS WITH MICROALBUMINURIA, WITHOUT LONG-TERM CURRENT USE OF INSULIN (HCC): ICD-10-CM

## 2019-12-19 DIAGNOSIS — Z00.00 ROUTINE GENERAL MEDICAL EXAMINATION AT A HEALTH CARE FACILITY: ICD-10-CM

## 2019-12-19 LAB
ALBUMIN SERPL-MCNC: 4 G/DL (ref 3.5–4.6)
ALP BLD-CCNC: 76 U/L (ref 40–130)
ALT SERPL-CCNC: 17 U/L (ref 0–33)
ANION GAP SERPL CALCULATED.3IONS-SCNC: 14 MEQ/L (ref 9–15)
AST SERPL-CCNC: 22 U/L (ref 0–35)
BILIRUB SERPL-MCNC: 1.1 MG/DL (ref 0.2–0.7)
BUN BLDV-MCNC: 24 MG/DL (ref 8–23)
CALCIUM SERPL-MCNC: 9.4 MG/DL (ref 8.5–9.9)
CHLORIDE BLD-SCNC: 97 MEQ/L (ref 95–107)
CHOLESTEROL, FASTING: 188 MG/DL (ref 0–199)
CO2: 26 MEQ/L (ref 20–31)
CREAT SERPL-MCNC: 1.01 MG/DL (ref 0.5–0.9)
GFR AFRICAN AMERICAN: >60
GFR NON-AFRICAN AMERICAN: 53.3
GLOBULIN: 3.3 G/DL (ref 2.3–3.5)
GLUCOSE FASTING: 124 MG/DL (ref 70–99)
HBA1C MFR BLD: 7.5 % (ref 4.8–5.9)
HDLC SERPL-MCNC: 44 MG/DL (ref 40–59)
LDL CHOLESTEROL CALCULATED: 71 MG/DL (ref 0–129)
POTASSIUM SERPL-SCNC: 4.9 MEQ/L (ref 3.4–4.9)
SODIUM BLD-SCNC: 137 MEQ/L (ref 135–144)
TOTAL PROTEIN: 7.3 G/DL (ref 6.3–8)
TRIGLYCERIDE, FASTING: 365 MG/DL (ref 0–150)

## 2020-03-10 ENCOUNTER — OFFICE VISIT (OUTPATIENT)
Dept: FAMILY MEDICINE CLINIC | Age: 75
End: 2020-03-10
Payer: MEDICARE

## 2020-03-10 VITALS
OXYGEN SATURATION: 98 % | DIASTOLIC BLOOD PRESSURE: 86 MMHG | WEIGHT: 186.6 LBS | BODY MASS INDEX: 32.03 KG/M2 | SYSTOLIC BLOOD PRESSURE: 140 MMHG | HEART RATE: 84 BPM

## 2020-03-10 PROCEDURE — 99213 OFFICE O/P EST LOW 20 MIN: CPT | Performed by: FAMILY MEDICINE

## 2020-03-10 RX ORDER — GLUCOSAMINE HCL/CHONDROITIN SU 500-400 MG
CAPSULE ORAL
Qty: 100 STRIP | Refills: 3 | Status: SHIPPED | OUTPATIENT
Start: 2020-03-10 | End: 2021-01-25 | Stop reason: SDUPTHER

## 2020-03-10 ASSESSMENT — ENCOUNTER SYMPTOMS
WHEEZING: 0
CONSTIPATION: 0
SHORTNESS OF BREATH: 0
SORE THROAT: 0
COUGH: 0
RHINORRHEA: 0
DIARRHEA: 0
ABDOMINAL PAIN: 0

## 2020-03-10 ASSESSMENT — PATIENT HEALTH QUESTIONNAIRE - PHQ9
SUM OF ALL RESPONSES TO PHQ QUESTIONS 1-9: 0
SUM OF ALL RESPONSES TO PHQ9 QUESTIONS 1 & 2: 0
2. FEELING DOWN, DEPRESSED OR HOPELESS: 0
1. LITTLE INTEREST OR PLEASURE IN DOING THINGS: 0
SUM OF ALL RESPONSES TO PHQ QUESTIONS 1-9: 0

## 2020-03-27 ENCOUNTER — HOSPITAL ENCOUNTER (OUTPATIENT)
Dept: LAB | Age: 75
Discharge: HOME | End: 2020-03-27
Payer: MEDICARE

## 2020-03-27 LAB
ANION GAP SERPL CALCULATED.3IONS-SCNC: 15 MEQ/L (ref 9–15)
BUN BLDV-MCNC: 27 MG/DL (ref 8–23)
CALCIUM SERPL-MCNC: 9.3 MG/DL (ref 8.5–9.9)
CHLORIDE BLD-SCNC: 99 MEQ/L (ref 95–107)
CO2: 24 MEQ/L (ref 20–31)
CREAT SERPL-MCNC: 0.89 MG/DL (ref 0.5–0.9)
GFR AFRICAN AMERICAN: >60
GFR NON-AFRICAN AMERICAN: >60
GLUCOSE BLD-MCNC: 134 MG/DL (ref 70–99)
POTASSIUM SERPL-SCNC: 4.5 MEQ/L (ref 3.4–4.9)
SODIUM BLD-SCNC: 138 MEQ/L (ref 135–144)

## 2020-03-27 PROCEDURE — 36415 COLL VENOUS BLD VENIPUNCTURE: CPT

## 2020-03-27 PROCEDURE — 80048 BASIC METABOLIC PNL TOTAL CA: CPT

## 2020-09-04 ENCOUNTER — OFFICE VISIT (OUTPATIENT)
Dept: INTERNAL MEDICINE | Age: 75
End: 2020-09-04
Payer: MEDICARE

## 2020-09-04 VITALS
WEIGHT: 182 LBS | DIASTOLIC BLOOD PRESSURE: 80 MMHG | HEIGHT: 64 IN | SYSTOLIC BLOOD PRESSURE: 138 MMHG | TEMPERATURE: 97.2 F | BODY MASS INDEX: 31.07 KG/M2 | HEART RATE: 64 BPM | OXYGEN SATURATION: 97 %

## 2020-09-04 LAB — HBA1C MFR BLD: 7.8 %

## 2020-09-04 PROCEDURE — 83036 HEMOGLOBIN GLYCOSYLATED A1C: CPT | Performed by: FAMILY MEDICINE

## 2020-09-04 PROCEDURE — 99214 OFFICE O/P EST MOD 30 MIN: CPT | Performed by: FAMILY MEDICINE

## 2020-09-04 PROCEDURE — 3051F HG A1C>EQUAL 7.0%<8.0%: CPT | Performed by: FAMILY MEDICINE

## 2020-09-04 RX ORDER — FENOFIBRATE 145 MG/1
TABLET, COATED ORAL
COMMUNITY
Start: 2020-08-19

## 2020-09-04 ASSESSMENT — ENCOUNTER SYMPTOMS
RHINORRHEA: 0
CONSTIPATION: 0
SORE THROAT: 0
ABDOMINAL PAIN: 0
SHORTNESS OF BREATH: 0
WHEEZING: 0
COUGH: 0
DIARRHEA: 0

## 2020-09-04 NOTE — PROGRESS NOTES
member of club or organization: Not on file     Attends meetings of clubs or organizations: Not on file     Relationship status: Not on file    Intimate partner violence     Fear of current or ex partner: Not on file     Emotionally abused: Not on file     Physically abused: Not on file     Forced sexual activity: Not on file   Other Topics Concern    Not on file   Social History Narrative    Not on file     Allergies   Allergen Reactions    Augmentin [Amoxicillin-Pot Clavulanate] Nausea And Vomiting    Flagyl [Metronidazole]     Metronidazole     Sulfa Antibiotics      Current Outpatient Medications   Medication Sig Dispense Refill    fenofibrate (TRICOR) 145 MG tablet TAKE 1 TABLET DAILY      blood glucose monitor strips  strip 3    traZODone (DESYREL) 100 MG tablet Take 1 tablet by mouth nightly 30 tablet 11    carvedilol (COREG) 25 MG tablet TAKE 1 & 1/2 tablet by mouth TWICE DAILY  3    amLODIPine (NORVASC) 5 MG tablet TAKE 1 TABLET BY MOUTH TWICE DAILY  3    diphenhydrAMINE-APAP, sleep, (TYLENOL PM EXTRA STRENGTH)  MG tablet Take 1 tablet by mouth      lisinopril (PRINIVIL;ZESTRIL) 20 MG tablet take 1 tablet by mouth twice daily  3    Lancet Device MISC 1 Device by Does not apply route 3 times daily Test 4x/day. 100 each 5    Blood Glucose Monitoring Suppl DONTRELL Use TID; also needs supplies for machine 1 Device 0    Lancets MISC  each 3    colestipol (COLESTID) 1 g tablet       omeprazole (PRILOSEC) 20 MG capsule Take 20 mg by mouth daily.  CO ENZYME Q-10 PO Take  by mouth.  Cyanocobalamin (VITAMIN B-12 CR) 1000 MCG TBCR Take  by mouth.  aspirin 81 MG tablet Take 81 mg by mouth daily.  Calcium Carbonate-Vitamin D (CALCIUM + D) 600-200 MG-UNIT TABS Take  by mouth.  pravastatin (PRAVACHOL) 40 MG tablet Take 40 mg by mouth daily. No current facility-administered medications for this visit.         ROS:  Review of Systems   Constitutional: Negative for chills and fever. HENT: Negative for rhinorrhea and sore throat. Respiratory: Negative for cough, shortness of breath and wheezing. Gastrointestinal: Negative for abdominal pain, constipation and diarrhea. Endocrine: Negative for polydipsia and polyuria. Genitourinary: Negative for dysuria, frequency and urgency. Neurological: Negative for syncope, light-headedness, numbness and headaches. Psychiatric/Behavioral: Negative for sleep disturbance. The patient is not nervous/anxious. Vitals:    09/04/20 1051   BP: 138/80   Site: Left Upper Arm   Position: Sitting   Cuff Size: Medium Adult   Pulse: 64   Temp: 97.2 °F (36.2 °C)   TempSrc: Infrared   SpO2: 97%   Weight: 182 lb (82.6 kg)   Height: 5' 4\" (1.626 m)       Physical exam:  Physical Exam  Vitals signs reviewed. Constitutional:       General: She is not in acute distress. Appearance: She is well-developed. HENT:      Head: Normocephalic and atraumatic. Mouth/Throat:      Pharynx: No oropharyngeal exudate. Neck:      Musculoskeletal: Normal range of motion. Thyroid: No thyromegaly. Cardiovascular:      Rate and Rhythm: Normal rate and regular rhythm. Heart sounds: Normal heart sounds. No murmur. Pulmonary:      Effort: Pulmonary effort is normal. No respiratory distress. Breath sounds: Normal breath sounds. No wheezing. Abdominal:      General: There is no distension. Palpations: Abdomen is soft. Tenderness: There is no abdominal tenderness. There is no guarding or rebound. Lymphadenopathy:      Cervical: No cervical adenopathy. Skin:     General: Skin is warm and dry. Neurological:      Mental Status: She is alert and oriented to person, place, and time. Psychiatric:         Behavior: Behavior normal.         Assessment/Plan:  66 y.o. female here mainly for DM2:  - DM2: at goal; no changes; might consider metformin if it keeps going up     Diagnosis Orders   1.  Type 2 diabetes mellitus with microalbuminuria, without long-term current use of insulin ([facility])  POCT glycosylated hemoglobin (Hb A1C)        Return in about 3 months (around 12/4/2020) for DM2.     Vadim Roca MD

## 2020-09-08 ENCOUNTER — TELEPHONE (OUTPATIENT)
Dept: FAMILY MEDICINE CLINIC | Age: 75
End: 2020-09-08

## 2020-09-08 RX ORDER — METFORMIN HYDROCHLORIDE 500 MG/1
500 TABLET, EXTENDED RELEASE ORAL
Qty: 90 TABLET | Refills: 3 | Status: SHIPPED | OUTPATIENT
Start: 2020-09-08 | End: 2021-08-30

## 2020-09-08 NOTE — TELEPHONE ENCOUNTER
Received call from Pt stating that she would like to start the Metformin as discussed with Dr. Marly Solorio on 9/4/2020. Pt would like this medication sent to the [facility] in [place].

## 2020-10-07 ENCOUNTER — NURSE ONLY (OUTPATIENT)
Dept: FAMILY MEDICINE CLINIC | Age: 75
End: 2020-10-07
Payer: MEDICARE

## 2020-10-07 ENCOUNTER — TELEPHONE (OUTPATIENT)
Dept: FAMILY MEDICINE CLINIC | Age: 75
End: 2020-10-07

## 2020-10-07 PROCEDURE — G0008 ADMIN INFLUENZA VIRUS VAC: HCPCS | Performed by: FAMILY MEDICINE

## 2020-10-07 PROCEDURE — 90694 VACC AIIV4 NO PRSRV 0.5ML IM: CPT | Performed by: FAMILY MEDICINE

## 2020-10-07 NOTE — TELEPHONE ENCOUNTER
? if can get checked to see if she has corona virus atb. Also ? if can \"take antibodies from daughter who was pos with covid\" lastly, would like covid vaccine before [place].

## 2020-10-07 NOTE — TELEPHONE ENCOUNTER
The antibody tests are for research purposes right now. No, you can't take your daughter's antibodies unless you enroll in a research study for this. Would love to give you the Coronavirus vaccine before Christmas. Looks like it won't be available by that time.

## 2020-10-07 NOTE — TELEPHONE ENCOUNTER
Pt is aware of the results and recommendations. States she heard on the news since she is high risk due to age and diabetes there is a vaccine out and she should receive it. I advised her there is not a vaccine.

## 2020-11-20 ENCOUNTER — VIRTUAL VISIT (OUTPATIENT)
Dept: FAMILY MEDICINE CLINIC | Age: 75
End: 2020-11-20
Payer: MEDICARE

## 2020-11-20 ENCOUNTER — HOSPITAL ENCOUNTER (OUTPATIENT)
Age: 75
Setting detail: SPECIMEN
Discharge: HOME | End: 2020-11-20
Payer: MEDICARE

## 2020-11-20 ENCOUNTER — NURSE ONLY (OUTPATIENT)
Dept: PRIMARY CARE CLINIC | Age: 75
End: 2020-11-20

## 2020-11-20 PROCEDURE — 99442 PR PHYS/QHP TELEPHONE EVALUATION 11-20 MIN: CPT | Performed by: NURSE PRACTITIONER

## 2020-11-20 PROCEDURE — U0003 INFECTIOUS AGENT DETECTION BY NUCLEIC ACID (DNA OR RNA); SEVERE ACUTE RESPIRATORY SYNDROME CORONAVIRUS 2 (SARS-COV-2) (CORONAVIRUS DISEASE [COVID-19]), AMPLIFIED PROBE TECHNIQUE, MAKING USE OF HIGH THROUGHPUT TECHNOLOGIES AS DESCRIBED BY CMS-2020-01-R: HCPCS

## 2020-11-20 ASSESSMENT — ENCOUNTER SYMPTOMS
BLOOD IN STOOL: 0
VOMITING: 0
SORE THROAT: 1
DIARRHEA: 1
NAUSEA: 0
CHEST TIGHTNESS: 0
SINUS PRESSURE: 0
ABDOMINAL PAIN: 0
WHEEZING: 0
COUGH: 1
RHINORRHEA: 0
SHORTNESS OF BREATH: 0
SINUS PAIN: 0

## 2020-11-20 NOTE — PROGRESS NOTES
Nati Velasco is a 66 y.o. female evaluated via telephone on 11/20/2020. Consent:  She and/or health care decision maker is aware that that she may receive a bill for this telephone service, depending on her insurance coverage, and has provided verbal consent to proceed: Yes      Documentation:  I communicated with the patient and/or health care decision maker about:    Patient presents with concern for COVID. Started with scratchy throat few days ago. Now coughing. Also having diarrhea this morning. Slightly chilled. She is supposed to have surgery 11/30 for rotator cuff surgery, was supposed to have COVID test next week. Also spoke with son and daughter in law that is a nurse, thinks she should get tested for COVID. Patient took tylenol PM last night, due to being achy and wanting to sleep    Patient concerned for thanksgiving and being around family    Review of Systems   Constitutional: Positive for appetite change and chills. Negative for fever (unsure). HENT: Positive for postnasal drip and sore throat. Negative for congestion, ear pain, rhinorrhea, sinus pressure and sinus pain. Respiratory: Positive for cough (dry). Negative for chest tightness, shortness of breath and wheezing. Cardiovascular: Positive for chest pain (when she laid on her left side, patient got up and moved and the pain went away). Gastrointestinal: Positive for diarrhea (few times). Negative for abdominal pain, blood in stool, nausea and vomiting. Neurological: Positive for headaches (mild). Details of this discussion including any medical advice provided: Will test for COVID. Advised to quarantine until test results. ED for any severe/worsening symptoms. I affirm this is a Patient Initiated Episode with a Patient who has not had a related appointment within my department in the past 7 days or scheduled within the next 24 hours.     Patient identification was verified at the start of the visit: Yes    Total Time: minutes: 11-20 minutes    Note: not billable if this call serves to triage the patient into an appointment for the relevant concern      Donnell Powers

## 2020-11-24 LAB
SARS-COV-2: DETECTED
SOURCE: ABNORMAL

## 2020-12-18 ENCOUNTER — OFFICE VISIT (OUTPATIENT)
Dept: FAMILY MEDICINE CLINIC | Age: 75
End: 2020-12-18
Payer: MEDICARE

## 2020-12-18 VITALS
OXYGEN SATURATION: 96 % | BODY MASS INDEX: 29.3 KG/M2 | HEART RATE: 84 BPM | TEMPERATURE: 97.3 F | HEIGHT: 64 IN | SYSTOLIC BLOOD PRESSURE: 122 MMHG | WEIGHT: 171.6 LBS | DIASTOLIC BLOOD PRESSURE: 74 MMHG

## 2020-12-18 LAB
BILIRUBIN, POC: ABNORMAL
BLOOD URINE, POC: ABNORMAL
CLARITY, POC: CLEAR
COLOR, POC: YELLOW
GLUCOSE URINE, POC: ABNORMAL
HBA1C MFR BLD: 7.2 %
KETONES, POC: ABNORMAL
LEUKOCYTE EST, POC: ABNORMAL
NITRITE, POC: ABNORMAL
PH, POC: 6
PROTEIN, POC: ABNORMAL
SPECIFIC GRAVITY, POC: 1.02
UROBILINOGEN, POC: ABNORMAL

## 2020-12-18 PROCEDURE — 81003 URINALYSIS AUTO W/O SCOPE: CPT | Performed by: FAMILY MEDICINE

## 2020-12-18 PROCEDURE — 3051F HG A1C>EQUAL 7.0%<8.0%: CPT | Performed by: FAMILY MEDICINE

## 2020-12-18 PROCEDURE — G0439 PPPS, SUBSEQ VISIT: HCPCS | Performed by: FAMILY MEDICINE

## 2020-12-18 PROCEDURE — 99214 OFFICE O/P EST MOD 30 MIN: CPT | Performed by: FAMILY MEDICINE

## 2020-12-18 PROCEDURE — 83036 HEMOGLOBIN GLYCOSYLATED A1C: CPT | Performed by: FAMILY MEDICINE

## 2020-12-18 ASSESSMENT — PATIENT HEALTH QUESTIONNAIRE - PHQ9
SUM OF ALL RESPONSES TO PHQ QUESTIONS 1-9: 0
1. LITTLE INTEREST OR PLEASURE IN DOING THINGS: 0
SUM OF ALL RESPONSES TO PHQ QUESTIONS 1-9: 0
SUM OF ALL RESPONSES TO PHQ9 QUESTIONS 1 & 2: 0
2. FEELING DOWN, DEPRESSED OR HOPELESS: 0
SUM OF ALL RESPONSES TO PHQ QUESTIONS 1-9: 0

## 2020-12-18 ASSESSMENT — LIFESTYLE VARIABLES
HOW OFTEN DO YOU HAVE A DRINK CONTAINING ALCOHOL: TWO TO FOUR TIMES A MONTH
AUDIT-C TOTAL SCORE: 0
HOW OFTEN DO YOU HAVE A DRINK CONTAINING ALCOHOL: 2
HOW OFTEN DO YOU HAVE SIX OR MORE DRINKS ON ONE OCCASION: NEVER
HAS A RELATIVE, FRIEND, DOCTOR, OR ANOTHER HEALTH PROFESSIONAL EXPRESSED CONCERN ABOUT YOUR DRINKING OR SUGGESTED YOU CUT DOWN: 0
HOW MANY STANDARD DRINKS CONTAINING ALCOHOL DO YOU HAVE ON A TYPICAL DAY: ONE OR TWO
AUDIT-C TOTAL SCORE: 2
HAVE YOU OR SOMEONE ELSE BEEN INJURED AS A RESULT OF YOUR DRINKING: NO
HAS A RELATIVE, FRIEND, DOCTOR, OR ANOTHER HEALTH PROFESSIONAL EXPRESSED CONCERN ABOUT YOUR DRINKING OR SUGGESTED YOU CUT DOWN: NO
AUDIT TOTAL SCORE: 2
HOW OFTEN DURING THE LAST YEAR HAVE YOU HAD A FEELING OF GUILT OR REMORSE AFTER DRINKING: 0
HOW OFTEN DURING THE LAST YEAR HAVE YOU NEEDED AN ALCOHOLIC DRINK FIRST THING IN THE MORNING TO GET YOURSELF GOING AFTER A NIGHT OF HEAVY DRINKING: 0
HOW OFTEN DURING THE LAST YEAR HAVE YOU BEEN UNABLE TO REMEMBER WHAT HAPPENED THE NIGHT BEFORE BECAUSE YOU HAD BEEN DRINKING: NEVER
HOW MANY STANDARD DRINKS CONTAINING ALCOHOL DO YOU HAVE ON A TYPICAL DAY: 0
HAVE YOU OR SOMEONE ELSE BEEN INJURED AS A RESULT OF YOUR DRINKING: 0
HOW OFTEN DURING THE LAST YEAR HAVE YOU BEEN UNABLE TO REMEMBER WHAT HAPPENED THE NIGHT BEFORE BECAUSE YOU HAD BEEN DRINKING: 0
HOW OFTEN DO YOU HAVE SIX OR MORE DRINKS ON ONE OCCASION: 0
HOW OFTEN DURING THE LAST YEAR HAVE YOU FAILED TO DO WHAT WAS NORMALLY EXPECTED FROM YOU BECAUSE OF DRINKING: 0
HOW OFTEN DURING THE LAST YEAR HAVE YOU FOUND THAT YOU WERE NOT ABLE TO STOP DRINKING ONCE YOU HAD STARTED: NEVER
HOW OFTEN DURING THE LAST YEAR HAVE YOU FOUND THAT YOU WERE NOT ABLE TO STOP DRINKING ONCE YOU HAD STARTED: 0
HOW OFTEN DURING THE LAST YEAR HAVE YOU HAD A FEELING OF GUILT OR REMORSE AFTER DRINKING: NEVER
HOW OFTEN DURING THE LAST YEAR HAVE YOU NEEDED AN ALCOHOLIC DRINK FIRST THING IN THE MORNING TO GET YOURSELF GOING AFTER A NIGHT OF HEAVY DRINKING: NEVER
HOW OFTEN DURING THE LAST YEAR HAVE YOU FAILED TO DO WHAT WAS NORMALLY EXPECTED FROM YOU BECAUSE OF DRINKING: NEVER
AUDIT TOTAL SCORE: 0

## 2020-12-18 NOTE — PROGRESS NOTES
Medicare Annual Wellness Visit  Name: Pako Byrd Date: 2020   MRN: [id number] Sex: Female   Age: 66 y.o. Ethnicity: Non-/Non    : 1945 Race: Linnea Gomez is here for Medicare AWV and Diabetes (follow up )    Screenings for behavioral, psychosocial and functional/safety risks, and cognitive dysfunction are all negative except as indicated below. These results, as well as other patient data from the [address] form, are documented in Flowsheets linked to this Encounter. Chief Complaint   Patient presents with    Medicare AWV    Diabetes     follow up         Allergies   Allergen Reactions    Augmentin [Amoxicillin-Pot Clavulanate] Nausea And Vomiting    Flagyl [Metronidazole]     Metronidazole     Sulfa Antibiotics        Prior to Visit Medications    Medication Sig Taking? Authorizing Provider   metFORMIN (GLUCOPHAGE XR) 500 MG extended release tablet Take 1 tablet by mouth daily (with breakfast) Yes Galen Cho MD   fenofibrate (TRICOR) 145 MG tablet TAKE 1 TABLET DAILY Yes Historical Provider, MD   blood glucose monitor strips TID Yes Galen Cho MD   traZODone (DESYREL) 100 MG tablet Take 1 tablet by mouth nightly Yes Galen Cho MD   carvedilol (COREG) 25 MG tablet TAKE 1 & 1/2 tablet by mouth TWICE DAILY Yes Historical Provider, MD   amLODIPine (NORVASC) 5 MG tablet TAKE 1 TABLET BY MOUTH TWICE DAILY Yes Historical Provider, MD   diphenhydrAMINE-APAP, sleep, (TYLENOL PM EXTRA STRENGTH)  MG tablet Take 1 tablet by mouth Yes Historical Provider, MD   lisinopril (PRINIVIL;ZESTRIL) 20 MG tablet take 1 tablet by mouth twice daily Yes Historical Provider, MD   Lancet Device MISC 1 Device by Does not apply route 3 times daily Test 4x/day.  Yes Galen Cho MD   Blood Glucose Monitoring Suppl DONTRELL Use TID; also needs supplies for machine Yes Galen Cho MD   Lancets MISC TID Yes Galen Cho MD colestipol (COLESTID) 1 g tablet  Yes Historical Provider, MD   omeprazole (PRILOSEC) 20 MG capsule Take 20 mg by mouth daily. Yes Historical Provider, MD   CO ENZYME Q-10 PO Take  by mouth. Yes Historical Provider, MD   Cyanocobalamin (VITAMIN B-12 CR) 1000 MCG TBCR Take  by mouth. Yes Historical Provider, MD   aspirin 81 MG tablet Take 81 mg by mouth daily. Yes Historical Provider, MD   Calcium Carbonate-Vitamin D (CALCIUM + D) 600-200 MG-UNIT TABS Take  by mouth. Yes Historical Provider, MD   pravastatin (PRAVACHOL) 40 MG tablet Take 40 mg by mouth daily.    Yes Historical Provider, MD       Past Medical History:   Diagnosis Date    Acid reflux     Arthritis     Hyperlipidemia     Hypertension     Spinal stenosis     Type II or unspecified type diabetes mellitus without mention of complication, not stated as uncontrolled        Past Surgical History:   Procedure Laterality Date    CHOLECYSTECTOMY      COLON SURGERY  2010    bowel reduction    EYE SURGERY Right     laser surgery tore retina     KNEE SURGERY Right 09/2013    torn menescus & baker cyst burst    LUMBAR LAMINECTOMY         Family History   Problem Relation Age of Onset    Diabetes Mother     Heart Disease Mother     Stroke Mother        CareTeam (Including outside providers/suppliers regularly involved in providing care):   Patient Care Team:  Rowdy Natarajan MD as PCP - General (Family Medicine)  Rowdy Natarajan MD as PCP - [facility] Empaneled Provider  Juan Sanchez MD (Cardiology)  Hayes Rajput DO (Internal Medicine Cardiovascular Disease)    Wt Readings from Last 3 Encounters:   12/18/20 171 lb 9.6 oz (77.8 kg)   09/04/20 182 lb (82.6 kg)   03/10/20 186 lb 9.6 oz (84.6 kg)     Vitals:    12/18/20 1517   BP: 122/74   Site: Left Upper Arm   Position: Sitting   Cuff Size: Medium Adult   Pulse: 84   Temp: 97.3 °F (36.3 °C)   TempSrc: Infrared   SpO2: 96%   Weight: 171 lb 9.6 oz (77.8 kg)   Height: 5' 4\" (1.626 m) Body mass index is 29.46 kg/m². Based upon direct observation of the patient, evaluation of cognition reveals recent and remote memory intact. Patient's complete Health Risk Assessment and screening values have been reviewed and are found in Flowsheets. The following problems were reviewed today and where indicated follow up appointments were made and/or referrals ordered. Positive Risk Factor Screenings with Interventions:          General Health and ACP:  General  In general, how would you say your health is?: Very Good  In the past 7 days, have you experienced any of the following?  New or Increased Pain, New or Increased Fatigue, Loneliness, Social Isolation, Stress or Anger?: None of These  Do you get the social and emotional support that you need?: Yes  Do you have a Living Will?: Yes  Advance Directives     Power of [address] [place] [address] Will ACP-Advance Directive ACP-Power of     Not on File Not on File Not on File Not on File      General Health Risk Interventions:  · NA     Hearing/Vision:  No exam data present  Hearing/Vision  Do you or your family notice any trouble with your hearing?: (!) Yes  Do you have difficulty driving, watching TV, or doing any of your daily activities because of your eyesight?: No  Have you had an eye exam within the past year?: Yes  Hearing/Vision Interventions:  · Hearing concerns:  patient declines any further evaluation/treatment for hearing issues    Safety:  Safety  Do you have working smoke detectors?: Yes  Have all throw rugs been removed or fastened?: Yes  Do you have non-slip mats or surfaces in all bathtubs/showers?: (!) No  Do all of your stairways have a railing or banister?: Yes  Are your doorways, halls and stairs free of clutter?: Yes  Do you always fasten your seatbelt when you are in a car?: Yes  Safety Interventions:  · Home safety tips provided     Personalized Preventive Plan   Current Health Maintenance Status  Immunization History Administered Date(s) Administered    Influenza 10/23/2013    Influenza, High Dose (Fluzone 65 yrs and older) 09/12/2016, 09/27/2017, 09/27/2018    Influenza, Quadv, adjuvanted, 65 yrs +, IM, PF (Fluad) 10/07/2020    Influenza, Triv, inactivated, subunit, adjuvanted, IM (Fluad 65 yrs and older) 10/22/2019    Pneumococcal Conjugate 13-valent ([id number]) 09/12/2016    Pneumococcal Polysaccharide ([id number]) 11/17/2014    Zoster Live (Zostavax) 01/05/2011        Health Maintenance   Topic Date Due    DTaP/Tdap/Td vaccine (1 - Tdap) 11/10/1962    Shingles Vaccine (2 of 3) 03/02/2011    Annual Wellness Visit (AWV)  05/29/2019    Lipid screen  12/19/2020    Potassium monitoring  03/27/2021    Creatinine monitoring  03/27/2021    Flu vaccine  Completed    Pneumococcal 65+ years Vaccine  Completed    DEXA (modify frequency per FRAX score)  Addressed    Hepatitis A vaccine  Aged Out    Hib vaccine  Aged Out    Meningococcal (ACWY) vaccine  Aged Out     Recommendations for [employer] Due: see orders and patient instructions/AVS.  . Recommended screening schedule for the next 5-10 years is provided to the patient in written form: see Patient Cory Cruz was seen today for medicare awv and diabetes.     Diagnoses and all orders for this visit:    Type 2 diabetes mellitus with microalbuminuria, without long-term current use of insulin (HCC)  -     POCT glycosylated hemoglobin (Hb A1C)    Proteinuria, unspecified type  -     POCT Urinalysis No Micro (Auto)

## 2020-12-18 NOTE — PATIENT INSTRUCTIONS
Personalized Preventive Plan for Myke Quach - 12/18/2020  Medicare offers a range of preventive health benefits. Some of the tests and screenings are paid in full while other may be subject to a deductible, co-insurance, and/or copay. Some of these benefits include a comprehensive review of your medical history including lifestyle, illnesses that may run in your family, and various assessments and screenings as appropriate. After reviewing your medical record and screening and assessments performed today your provider may have ordered immunizations, labs, imaging, and/or referrals for you. A list of these orders (if applicable) as well as your Preventive Care list are included within your After Visit Summary for your review. Other Preventive Recommendations:    · A preventive eye exam performed by an eye specialist is recommended every 1-2 years to screen for glaucoma; cataracts, macular degeneration, and other eye disorders. · A preventive dental visit is recommended every 6 months. · Try to get at least 150 minutes of exercise per week or 10,000 steps per day on a pedometer . · Order or download the FREE \"Exercise & Physical Activity: Your Everyday Guide\" from [employer]. Call 7-193.522.1912 or search [employer] online. · You need 9763-2462 mg of calcium and 8036-8923 IU of vitamin D per day. It is possible to meet your calcium requirement with diet alone, but a vitamin D supplement is usually necessary to meet this goal.  · When exposed to the sun, use a sunscreen that protects against both UVA and UVB radiation with an SPF of 30 or greater. Reapply every 2 to 3 hours or after sweating, drying off with a towel, or swimming. · Always wear a seat belt when traveling in a car. Always wear a helmet when riding a bicycle or motorcycle.

## 2020-12-18 NOTE — PROGRESS NOTES
Gets together: Not on file     Attends [employer] service: Not on file     Active member of club or organization: Not on file     Attends meetings of clubs or organizations: Not on file     Relationship status: Not on file    Intimate partner violence     Fear of current or ex partner: Not on file     Emotionally abused: Not on file     Physically abused: Not on file     Forced sexual activity: Not on file   Other Topics Concern    Not on file   Social History Narrative    Not on file     Allergies   Allergen Reactions    Augmentin [Amoxicillin-Pot Clavulanate] Nausea And Vomiting    Flagyl [Metronidazole]     Metronidazole     Sulfa Antibiotics      Current Outpatient Medications   Medication Sig Dispense Refill    metFORMIN (GLUCOPHAGE XR) 500 MG extended release tablet Take 1 tablet by mouth daily (with breakfast) 90 tablet 3    fenofibrate (TRICOR) 145 MG tablet TAKE 1 TABLET DAILY      blood glucose monitor strips  strip 3    traZODone (DESYREL) 100 MG tablet Take 1 tablet by mouth nightly 30 tablet 11    carvedilol (COREG) 25 MG tablet TAKE 1 & 1/2 tablet by mouth TWICE DAILY  3    amLODIPine (NORVASC) 5 MG tablet TAKE 1 TABLET BY MOUTH TWICE DAILY  3    diphenhydrAMINE-APAP, sleep, (TYLENOL PM EXTRA STRENGTH)  MG tablet Take 1 tablet by mouth      lisinopril (PRINIVIL;ZESTRIL) 20 MG tablet take 1 tablet by mouth twice daily  3    Lancet Device MISC 1 Device by Does not apply route 3 times daily Test 4x/day. 100 each 5    Blood Glucose Monitoring Suppl DONTRELL Use TID; also needs supplies for machine 1 Device 0    Lancets MISC  each 3    colestipol (COLESTID) 1 g tablet       omeprazole (PRILOSEC) 20 MG capsule Take 20 mg by mouth daily.  CO ENZYME Q-10 PO Take  by mouth.  Cyanocobalamin (VITAMIN B-12 CR) 1000 MCG TBCR Take  by mouth.  aspirin 81 MG tablet Take 81 mg by mouth daily. - She prefers to skip labs this time  - should get the vaccine when available even though she has had the infection.  - She is planning to go to [place] for the next 3 months and a trip to [facility]. Diagnosis Orders   1. Type 2 diabetes mellitus with microalbuminuria, without long-term current use of insulin (HCC)  POCT glycosylated hemoglobin (Hb A1C)   2. Proteinuria, unspecified type  POCT Urinalysis No Micro (Auto)   3. Routine general medical examination at a health care facility          Return for Medicare Annual Wellness Visit in 1 year.     Polo Bonilla MD

## 2021-01-15 DIAGNOSIS — Z23 NEED FOR VACCINATION: ICD-10-CM

## 2021-01-25 DIAGNOSIS — E11.29 TYPE 2 DIABETES MELLITUS WITH MICROALBUMINURIA, WITHOUT LONG-TERM CURRENT USE OF INSULIN (HCC): ICD-10-CM

## 2021-01-25 DIAGNOSIS — R80.9 TYPE 2 DIABETES MELLITUS WITH MICROALBUMINURIA, WITHOUT LONG-TERM CURRENT USE OF INSULIN (HCC): ICD-10-CM

## 2021-01-25 RX ORDER — GLUCOSAMINE HCL/CHONDROITIN SU 500-400 MG
CAPSULE ORAL
Qty: 100 STRIP | Refills: 3 | Status: SHIPPED | OUTPATIENT
Start: 2021-01-25 | End: 2021-01-27 | Stop reason: SDUPTHER

## 2021-01-25 NOTE — TELEPHONE ENCOUNTER
Rx requested:  Requested Prescriptions     Pending Prescriptions Disp Refills    blood glucose monitor strips 100 strip 3     Sig: TID       Last Office Visit:   12/18/2020      Last filled:      Next Visit Date:  No future appointments.

## 2021-01-25 NOTE — TELEPHONE ENCOUNTER
Patient requesting test strips to [facility] in [place], [place]    Thank you

## 2021-01-27 DIAGNOSIS — E11.29 TYPE 2 DIABETES MELLITUS WITH MICROALBUMINURIA, WITHOUT LONG-TERM CURRENT USE OF INSULIN (HCC): ICD-10-CM

## 2021-01-27 DIAGNOSIS — R80.9 TYPE 2 DIABETES MELLITUS WITH MICROALBUMINURIA, WITHOUT LONG-TERM CURRENT USE OF INSULIN (HCC): ICD-10-CM

## 2021-01-27 RX ORDER — GLUCOSAMINE HCL/CHONDROITIN SU 500-400 MG
CAPSULE ORAL
Qty: 100 STRIP | Refills: 3 | Status: SHIPPED | OUTPATIENT
Start: 2021-01-27 | End: 2021-09-15

## 2021-01-27 NOTE — TELEPHONE ENCOUNTER
Rx requested:  Requested Prescriptions     Pending Prescriptions Disp Refills    blood glucose monitor strips 100 strip 3     Sig: TID       Last Office Visit:   12/18/2020      Last filled:      Next Visit Date:  No future appointments. **[facility] (in [place], [place]) did not receive the prescription sent on 1/25/21. Please resend and include the name of the device: AccuCheck SOLANGE.

## 2021-01-28 ENCOUNTER — TELEPHONE (OUTPATIENT)
Dept: FAMILY MEDICINE CLINIC | Age: 76
End: 2021-01-28

## 2021-02-20 DIAGNOSIS — F51.01 PRIMARY INSOMNIA: ICD-10-CM

## 2021-02-20 NOTE — TELEPHONE ENCOUNTER
Requesting medication refill. Please approve or deny this request.    Rx requested:  Requested Prescriptions     Pending Prescriptions Disp Refills    traZODone (DESYREL) 100 MG tablet [Pharmacy Med Name: trazodone 100 mg tablet] 30 tablet 11     Sig: TAKE 1 TABLET BY MOUTH NIGHTLY       Last Office Visit:   12/18/2020        REASON LAST SEEN AND BY WHO:    juan     FOLLOW UP PLAN FROM LAST PCP VISIT: COPY AND PASTE FROM LAST PCP NOTE     Return in 6 months (on 6/18/2021) for DM2. PATIENT CONTACTED FOR A FOLLOW UP APPT: YES OR NO    no    Next Visit Date:  No future appointments.

## 2021-02-22 RX ORDER — TRAZODONE HYDROCHLORIDE 100 MG/1
TABLET ORAL
Qty: 30 TABLET | Refills: 11 | Status: SHIPPED | OUTPATIENT
Start: 2021-02-22 | End: 2022-02-25

## 2021-03-25 DIAGNOSIS — E11.29 TYPE 2 DIABETES MELLITUS WITH MICROALBUMINURIA, WITHOUT LONG-TERM CURRENT USE OF INSULIN (HCC): ICD-10-CM

## 2021-03-25 DIAGNOSIS — R80.9 TYPE 2 DIABETES MELLITUS WITH MICROALBUMINURIA, WITHOUT LONG-TERM CURRENT USE OF INSULIN (HCC): ICD-10-CM

## 2021-03-25 NOTE — TELEPHONE ENCOUNTER
Rx requested:  Requested Prescriptions     Pending Prescriptions Disp Refills    Accu-Chek FastClix Lancets [address] [facility] [Pharmacy Med Name: Accu-Chek Fastclix Lancet Drum] 102 each 3     Sig: test THREE TIMES DAILY       Last Office Visit:   12/18/2020      Last filled:      Next Visit Date:  No future appointments.

## 2021-03-26 RX ORDER — LANCETS
EACH MISCELLANEOUS
Qty: 102 EACH | Refills: 3 | Status: SHIPPED | OUTPATIENT
Start: 2021-03-26 | End: 2022-05-16

## 2021-06-19 ENCOUNTER — OFFICE VISIT (OUTPATIENT)
Dept: INTERNAL MEDICINE | Age: 76
End: 2021-06-19
Payer: MEDICARE

## 2021-06-19 VITALS
SYSTOLIC BLOOD PRESSURE: 124 MMHG | TEMPERATURE: 97.5 F | WEIGHT: 166.4 LBS | HEART RATE: 75 BPM | DIASTOLIC BLOOD PRESSURE: 72 MMHG | OXYGEN SATURATION: 95 % | BODY MASS INDEX: 28.56 KG/M2

## 2021-06-19 DIAGNOSIS — J02.9 ST (SORE THROAT): Primary | ICD-10-CM

## 2021-06-19 LAB — S PYO AG THROAT QL: NORMAL

## 2021-06-19 PROCEDURE — 87880 STREP A ASSAY W/OPTIC: CPT | Performed by: NURSE PRACTITIONER

## 2021-06-19 PROCEDURE — 99213 OFFICE O/P EST LOW 20 MIN: CPT | Performed by: NURSE PRACTITIONER

## 2021-06-19 SDOH — ECONOMIC STABILITY: FOOD INSECURITY: WITHIN THE PAST 12 MONTHS, YOU WORRIED THAT YOUR FOOD WOULD RUN OUT BEFORE YOU GOT MONEY TO BUY MORE.: NEVER TRUE

## 2021-06-19 SDOH — ECONOMIC STABILITY: FOOD INSECURITY: WITHIN THE PAST 12 MONTHS, THE FOOD YOU BOUGHT JUST DIDN'T LAST AND YOU DIDN'T HAVE MONEY TO GET MORE.: NEVER TRUE

## 2021-06-19 ASSESSMENT — ENCOUNTER SYMPTOMS
EYE REDNESS: 0
SWOLLEN GLANDS: 0
SORE THROAT: 1
SINUS PRESSURE: 0
COUGH: 0
RHINORRHEA: 0
SHORTNESS OF BREATH: 0
EYE DISCHARGE: 0
EYE ITCHING: 1
WHEEZING: 0

## 2021-06-19 ASSESSMENT — PATIENT HEALTH QUESTIONNAIRE - PHQ9
2. FEELING DOWN, DEPRESSED OR HOPELESS: 0
SUM OF ALL RESPONSES TO PHQ9 QUESTIONS 1 & 2: 0
SUM OF ALL RESPONSES TO PHQ QUESTIONS 1-9: 0
1. LITTLE INTEREST OR PLEASURE IN DOING THINGS: 0

## 2021-06-19 ASSESSMENT — SOCIAL DETERMINANTS OF HEALTH (SDOH): HOW HARD IS IT FOR YOU TO PAY FOR THE VERY BASICS LIKE FOOD, HOUSING, MEDICAL CARE, AND HEATING?: NOT HARD AT ALL

## 2021-06-19 NOTE — PROGRESS NOTES
Nose: Mucosal edema present. Right Turbinates: Swollen. Left Turbinates: Swollen. Right Sinus: No maxillary sinus tenderness or frontal sinus tenderness. Left Sinus: No maxillary sinus tenderness or frontal sinus tenderness. Mouth/Throat:      Lips: Pink. Mouth: Mucous membranes are moist.      Pharynx: No oropharyngeal exudate or posterior oropharyngeal erythema. Cardiovascular:      Rate and Rhythm: Normal rate and regular rhythm. Heart sounds: Normal heart sounds, S1 normal and S2 normal.   Pulmonary:      Effort: Pulmonary effort is normal. No respiratory distress. Breath sounds: Normal air entry. Skin:     General: Skin is warm and dry. Neurological:      Mental Status: She is alert and oriented to person, place, and time. Psychiatric:         Mood and Affect: Mood normal.         Behavior: Behavior is cooperative. An electronic signature was used to authenticate this note.     --SURINDER Velarde

## 2021-06-19 NOTE — PATIENT INSTRUCTIONS
Patient Education        Sore Throat: Care Instructions  Your Care Instructions     Infection by bacteria or a virus causes most sore throats. Cigarette smoke, dry air, air pollution, allergies, and yelling can also cause a sore throat. Sore throats can be painful and annoying. Fortunately, most sore throats go away on their own. If you have a bacterial infection, your doctor may prescribe antibiotics. Follow-up care is a key part of your treatment and safety. Be sure to make and go to all appointments, and call your doctor if you are having problems. It's also a good idea to know your test results and keep a list of the medicines you take. How can you care for yourself at home? · If your doctor prescribed antibiotics, take them as directed. Do not stop taking them just because you feel better. You need to take the full course of antibiotics. · Gargle with warm salt water once an hour to help reduce swelling and relieve discomfort. Use 1 teaspoon of salt mixed in 1 cup of warm water. · Take an over-the-counter pain medicine, such as acetaminophen (Tylenol), ibuprofen (Advil, Motrin), or naproxen (Aleve). Read and follow all instructions on the label. · Be careful when taking over-the-counter cold or flu medicines and Tylenol at the same time. Many of these medicines have acetaminophen, which is Tylenol. Read the labels to make sure that you are not taking more than the recommended dose. Too much acetaminophen (Tylenol) can be harmful. · Drink plenty of fluids. Fluids may help soothe an irritated throat. Hot fluids, such as tea or soup, may help decrease throat pain. · Use over-the-counter throat lozenges to soothe pain. Regular cough drops or hard candy may also help. These should not be given to young children because of the risk of choking. · Do not smoke or allow others to smoke around you. If you need help quitting, talk to your doctor about stop-smoking programs and medicines.  These can increase your chances of quitting for good. · Use a vaporizer or humidifier to add moisture to your bedroom. Follow the directions for cleaning the machine. When should you call for help? Call your doctor now or seek immediate medical care if:    · You have new or worse trouble swallowing.     · Your sore throat gets much worse on one side. Watch closely for changes in your health, and be sure to contact your doctor if you do not get better as expected. Where can you learn more? Go to [email or web address].[email or web address] org and sign in to your [employer] account. Enter C766 in the [employer] box to learn more about \"Sore Throat: Care Instructions. \"     If you do not have an account, please click on the \"Sign Up Now\" link. Current as of: December 2, 2020               Content Version: 12.9  © 2006-2021 [employer]. Care instructions adapted under license by [facility] ([facility]). If you have questions about a medical condition or this instruction, always ask your healthcare professional. [place] [address] any warranty or liability for your use of this information. Patient Education        menthol topical (oral mucous membrane)  Pronunciation:  MEN thol TOP ik al (OR al MYOO kus MEM brane)  Brand:  Cepacol Sore Throat Max Numb, Ironside Mentho-Lyptus Drops, Koldets Cough Drops, Luden's Honey Lemon Throat Drops, Medikoff, N Ice, Robitussin Cough Drop Roby Honey, VapoDrops Menthol  What is the most important information I should know about menthol topical?  Follow all directions on your medicine label and package. Tell each of your healthcare providers about all your medical conditions, allergies, and all medicines you use. What is menthol topical?  Menthol is either manmade or made from the extracts of mint oil. Menthol provides a cooling sensation when applied to the skin or other tissues (such as the tongue, gums, or inside the cheeks).   Menthol topical oral mucous membrane (for use inside the mouth) is used to treat minor sore throat pain, or mouth irritation caused by a canker sore. Menthol topical may also be used for purposes not listed in this medication guide. What should I discuss with my healthcare provider before using menthol topical?  You should not use this medicine if you are allergic to menthol. Ask a doctor or pharmacist if it is safe for you to use this medicine if you have other medical conditions, especially:  · cough with mucus;  · cough caused by smoking, emphysema, or chronic bronchitis;  · a sore throat with fever, headache, swelling, skin rash, or nausea and vomiting; or  · if you are pregnant or breast-feeding. A menthol lozenge may contain glucose (sugar) or phenylalanine. Talk to your doctor before using this medicine if you have diabetes or phenylketonuria (PKU). Do not give this medicine to a baby or young child without a doctor's advice. How should I use menthol topical?  Use exactly as directed on the label, or as prescribed by your doctor. Do not swallow the menthol lozenge whole. Allow it to dissolve in your mouth without chewing. Use a new lozenge every 2 hours or as needed. Call your doctor if your sore throat is severe or continues for longer than 2 days, especially if you also have a fever, headache, skin rash, swelling, or nausea and vomiting. Stop using and call your doctor if you have a cough that does not improve after 7 days of treatment. Store at room temperature away from moisture and heat. What happens if I miss a dose? Since menthol topical is when needed, you are not likely to miss a dose. What happens if I overdose? Seek emergency medical attention or call the Poison Help line at 1-540.857.1298. What should I avoid while taking menthol topical?  Follow your doctor's instructions about any restrictions on food, beverages, or activity.   What are the possible side effects of menthol topical?  Get emergency medical help if you have signs of an allergic reaction: hives; difficult breathing; swelling of your face, lips, tongue, or throat. Less serious side effects are more likely, and you may have none at all. This is not a complete list of side effects and others may occur. Call your doctor for medical advice about side effects. You may report side effects to FDA at 6-256-BZK-5683. What other drugs will affect menthol topical?  Menthol topical used in the mouth is not likely to be affected by other drugs you use. But many drugs can interact with each other. Tell each of your health care providers about all medicines you use, including prescription and over-the-counter medicines, vitamins, and herbal products. Where can I get more information? Your pharmacist can provide more information about menthol topical oral mucous membrane  Remember, keep this and all other medicines out of the reach of children, never share your medicines with others, and use this medication only for the indication prescribed. Every effort has been made to ensure that the information provided by [address] is accurate, up-to-date, and complete, but no guarantee is made to that effect. Drug information contained herein may be time sensitive. [facility] information has been compiled for use by healthcare practitioners and consumers in the [place] and therefore [employer] does not warrant that uses outside of the [place] are appropriate, unless specifically indicated otherwise. [facility] drug information does not endorse drugs, diagnose patients or recommend therapy. [facility] drug information is an informational resource designed to assist licensed healthcare practitioners in caring for their patients and/or to serve consumers viewing this service as a supplement to, and not a substitute for, the expertise, skill, knowledge and judgment of healthcare practitioners.  The absence of a warning for a given drug or drug combination in no way should be construed to indicate that the drug or drug combination is safe, effective or appropriate for any given patient. [facility] does not assume any responsibility for any aspect of healthcare administered with the aid of information [facility] provides. The information contained herein is not intended to cover all possible uses, directions, precautions, warnings, drug interactions, allergic reactions, or adverse effects. If you have questions about the drugs you are taking, check with your doctor, nurse or pharmacist.  Copyright 4910-5434 [address]: 1.09. Revision date: 4/19/2019. Care instructions adapted under license by [facility] ([facility]). If you have questions about a medical condition or this instruction, always ask your healthcare professional. [place] [address] any warranty or liability for your use of this information.

## 2021-06-20 DIAGNOSIS — J02.9 ST (SORE THROAT): ICD-10-CM

## 2021-06-22 LAB — THROAT CULTURE: NORMAL

## 2021-08-12 ENCOUNTER — HOSPITAL ENCOUNTER (OUTPATIENT)
Age: 76
Setting detail: SPECIMEN
Discharge: HOME | End: 2021-08-12
Payer: MEDICARE

## 2021-08-12 ENCOUNTER — OFFICE VISIT (OUTPATIENT)
Dept: OBGYN CLINIC | Age: 76
End: 2021-08-12
Payer: MEDICARE

## 2021-08-12 VITALS
BODY MASS INDEX: 28.34 KG/M2 | SYSTOLIC BLOOD PRESSURE: 138 MMHG | HEIGHT: 64 IN | HEART RATE: 80 BPM | WEIGHT: 166 LBS | DIASTOLIC BLOOD PRESSURE: 82 MMHG

## 2021-08-12 DIAGNOSIS — Z12.31 ENCOUNTER FOR MAMMOGRAM TO ESTABLISH BASELINE MAMMOGRAM: ICD-10-CM

## 2021-08-12 DIAGNOSIS — Z01.419 VISIT FOR GYNECOLOGIC EXAMINATION: Primary | ICD-10-CM

## 2021-08-12 DIAGNOSIS — Z11.51 SCREENING FOR HPV (HUMAN PAPILLOMAVIRUS): ICD-10-CM

## 2021-08-12 PROCEDURE — 88175 CYTOPATH C/V AUTO FLUID REDO: CPT

## 2021-08-12 PROCEDURE — 99387 INIT PM E/M NEW PAT 65+ YRS: CPT | Performed by: OBSTETRICS & GYNECOLOGY

## 2021-08-12 PROCEDURE — 87624 HPV HI-RISK TYP POOLED RSLT: CPT

## 2021-08-12 ASSESSMENT — ENCOUNTER SYMPTOMS
SHORTNESS OF BREATH: 0
COUGH: 0
DIARRHEA: 0
CONSTIPATION: 0
SORE THROAT: 0
VOMITING: 0
WHEEZING: 0
NAUSEA: 0
ABDOMINAL DISTENTION: 0
BLOOD IN STOOL: 0
ABDOMINAL PAIN: 0

## 2021-08-12 NOTE — PROGRESS NOTES
Postmenopausal Annual     Myke Quach is a 66y.o. year old  who presents today for her annual well woman exam.  The patient is sexually active. The patient has never been taking hormone replacement therapy. Patient denies post-menopausal vaginal bleeding.     The patient has regular exercise: yes    Vitals:  /82 (Site: Left Upper Arm, Position: Sitting, Cuff Size: Medium Adult)   Pulse 80   Ht 5' 4\" (1.626 m)   Wt 166 lb (75.3 kg)   BMI 28.49 kg/m²   Allergies:  Augmentin [amoxicillin-pot clavulanate], Flagyl [metronidazole], Metronidazole, and Sulfa antibiotics  Past Medical History:   Diagnosis Date    Acid reflux     Arthritis     Hyperlipidemia     Hypertension     Spinal stenosis     Type II or unspecified type diabetes mellitus without mention of complication, not stated as uncontrolled      Past Surgical History:   Procedure Laterality Date    CHOLECYSTECTOMY      COLON SURGERY  2010    bowel reduction    EYE SURGERY Right     laser surgery tore retina     KNEE SURGERY Right 09/2013    torn tyresecus & baker cyst burst    LUMBAR LAMINECTOMY       Family History   Problem Relation Age of Onset    Diabetes Mother     Heart Disease Mother     Stroke Mother      Social History     Socioeconomic History    Marital status:      Spouse name: Not on file    Number of children: Not on file    Years of education: Not on file    Highest education level: Not on file   Occupational History    Not on file   Tobacco Use    Smoking status: Never Smoker    Smokeless tobacco: Never Used   Substance and Sexual Activity    Alcohol use: Yes     Comment: rare    Drug use: No    Sexual activity: Yes   Other Topics Concern    Not on file   Social History Narrative    Not on file     Social Determinants of Health     Financial Resource Strain: Low Risk     Difficulty of Paying Living Expenses: Not hard at all   Food Insecurity: No Food Insecurity    Worried About Running Out of Evergig in the Last Year: Never true    Ran Out of Food in the Last Year: Never true   Transportation Needs:     Lack of Transportation (Medical):  Lack of Transportation (Non-Medical):    Physical Activity:     Days of Exercise per Week:     Minutes of Exercise per Session:    Stress:     Feeling of Stress :    Social Connections:     Frequency of Communication with Friends and Family:     Frequency of Social Gatherings with Friends and Family:     Attends Sikhism Services:     Active Member of Clubs or Organizations:     Attends Club or Organization Meetings:     Marital Status:    Intimate Partner Violence:     Fear of Current or Ex-Partner:     Emotionally Abused:     Physically Abused:     Sexually Abused:        Last mammogram 2019  Breast cancer risk factors : no known risk  Last Pap a few years negative    No LMP recorded. Patient is postmenopausal.  Age at menopause onset: 44  Menopausal symptom assessment: None  Urinary incontinence & GUsymptoms: None  Sexual dysfunction: Some concern about prolonged arousal phase to orgasm but improving  Present Hormonal medications: None    Osteoporosis risk assessment : Small body frame  Last bone mineral density : Never had    History of abnormal lipids:yes -hyper  Hypertension yes  Stroke/MI no    Yearly flu vaccine recommended for persons aged 48 and older. Colonoscopyup-to-date    Review of Systems  Review of Systems   Constitutional: Negative for activity change, appetite change, fatigue and unexpected weight change. HENT: Negative for nosebleeds and sore throat. Eyes: Negative for visual disturbance. Respiratory: Negative for cough, shortness of breath and wheezing. Cardiovascular: Negative for chest pain, palpitations and leg swelling. Gastrointestinal: Negative for abdominal distention, abdominal pain, blood in stool, constipation, diarrhea, nausea and vomiting.    Endocrine: Negative for cold intolerance, heat intolerance, polydipsia and polyuria. Genitourinary: Negative for difficulty urinating, dyspareunia, dysuria, frequency, genital sores, hematuria, pelvic pain, urgency, vaginal bleeding, vaginal discharge and vaginal pain. Musculoskeletal: Negative for arthralgias. Skin: Negative for rash. Neurological: Negative for dizziness, weakness, light-headedness and headaches. Hematological: Negative for adenopathy. Does not bruise/bleed easily. Psychiatric/Behavioral: Negative for agitation, confusion, dysphoric mood and sleep disturbance. All other systems reviewed and are negative. Objective:     Vitals:  /82 (Site: Left Upper Arm, Position: Sitting, Cuff Size: Medium Adult)   Pulse 80   Ht 5' 4\" (1.626 m)   Wt 166 lb (75.3 kg)   BMI 28.49 kg/m²     Physical Exam  Physical Exam  Constitutional:       General: She is not in acute distress. Appearance: Normal appearance. She is well-developed. She is not diaphoretic. HENT:      Head: Normocephalic. Eyes:      Conjunctiva/sclera: Conjunctivae normal.      Pupils: Pupils are equal, round, and reactive to light. Neck:      Thyroid: No thyromegaly. Trachea: No tracheal deviation. Cardiovascular:      Rate and Rhythm: Normal rate and regular rhythm. Heart sounds: Normal heart sounds. No murmur heard. No friction rub. No gallop. Pulmonary:      Effort: Pulmonary effort is normal. No respiratory distress. Breath sounds: Normal breath sounds. No wheezing or rales. Chest:      Chest wall: No tenderness. Abdominal:      General: Bowel sounds are normal. There is no distension. Palpations: Abdomen is soft. There is no mass. Tenderness: There is no abdominal tenderness. There is no guarding or rebound. Genitourinary:     Labia:         Right: No rash, tenderness or lesion. Left: No rash, tenderness or lesion. Vagina: Normal. No vaginal discharge, erythema, tenderness or bleeding.       Cervix: No cervical motion tenderness, discharge or friability. Uterus: Not deviated, not enlarged, not fixed and not tender. Adnexa:         Right: No mass, tenderness or fullness. Left: No mass, tenderness or fullness. Comments: Uterus is not prolapsed and there is not a cystocele or rectocele noted  Musculoskeletal:      Right lower leg: No edema. Left lower leg: No edema. Skin:     General: Skin is warm and dry. Neurological:      Mental Status: She is alert and oriented to person, place, and time. Cranial Nerves: No cranial nerve deficit. Deep Tendon Reflexes: Reflexes normal.   Psychiatric:         Behavior: Behavior normal.         Judgment: Judgment normal.         Assessment:      Diagnosis Orders   1. Visit for gynecologic examination  PAP SMEAR   2. Screening for HPV (human papillomavirus)  PAP SMEAR   3. Encounter for mammogram to establish baseline mammogram  [facility] DIGITAL SCREEN W OR WO CAD BILATERAL       Body mass index is 28.49 kg/m². Obesity:  Overweight  Smoking:  No    Plan:   Pap : indicated:  performed. Obesity Counseling:  N/A  Smoking Counseling:  N/A    Orders Placed This Encounter   Procedures    DAYDAY DIGITAL SCREEN W OR WO CAD BILATERAL     Standing Status:   Future     Standing Expiration Date:   10/12/2022    PAP SMEAR     Standing Status:   Future     Standing Expiration Date:   8/12/2022     Order Specific Question:   Collection Type     Answer: Thin Prep     Order Specific Question:   Prior Abnormal Pap Test     Answer:   No     Order Specific Question:   Screening or Diagnostic     Answer:   Screening     Order Specific Question:   HPV Requested? Answer:   Yes     Order Specific Question:   High Risk Patient     Answer:   N/A     No orders of the defined types were placed in this encounter. Follow up:  Return in about 2 years (around 8/12/2023). Jeannine Conley DO    HEALTH MAINTENANCE:   Health information given.    Women's Health patient information and counselling done. Counseled regarding risk/benefits/alternatives to Hormone Therapyand need for yearly re-evaluation. Periodic Pap smear discussed. Mammogram recommended yearly. Colon cancer screening by age 48 & every 5-10 years. Bone mineral density (by age 72 or sooner if indication itwould affect Hormone Therapy management or other risk factors for osteoporosis).

## 2021-08-19 LAB
HPV COMMENT: NORMAL
HPV TYPE 16: NOT DETECTED
HPV TYPE 18: NOT DETECTED
HPVOH (OTHER TYPES): NOT DETECTED

## 2021-09-01 ENCOUNTER — OFFICE VISIT (OUTPATIENT)
Dept: INTERNAL MEDICINE | Age: 76
End: 2021-09-01
Payer: MEDICARE

## 2021-09-01 VITALS
DIASTOLIC BLOOD PRESSURE: 70 MMHG | WEIGHT: 166 LBS | TEMPERATURE: 97.2 F | HEART RATE: 62 BPM | SYSTOLIC BLOOD PRESSURE: 142 MMHG | BODY MASS INDEX: 28.49 KG/M2 | OXYGEN SATURATION: 98 %

## 2021-09-01 DIAGNOSIS — E11.29 TYPE 2 DIABETES MELLITUS WITH MICROALBUMINURIA, WITHOUT LONG-TERM CURRENT USE OF INSULIN (HCC): Primary | ICD-10-CM

## 2021-09-01 DIAGNOSIS — R80.9 TYPE 2 DIABETES MELLITUS WITH MICROALBUMINURIA, WITHOUT LONG-TERM CURRENT USE OF INSULIN (HCC): Primary | ICD-10-CM

## 2021-09-01 DIAGNOSIS — Z12.31 ENCOUNTER FOR SCREENING MAMMOGRAM FOR MALIGNANT NEOPLASM OF BREAST: ICD-10-CM

## 2021-09-01 DIAGNOSIS — I10 ESSENTIAL HYPERTENSION: ICD-10-CM

## 2021-09-01 LAB — HBA1C MFR BLD: 7.5 %

## 2021-09-01 PROCEDURE — 99214 OFFICE O/P EST MOD 30 MIN: CPT | Performed by: FAMILY MEDICINE

## 2021-09-01 PROCEDURE — 83036 HEMOGLOBIN GLYCOSYLATED A1C: CPT | Performed by: FAMILY MEDICINE

## 2021-09-01 PROCEDURE — 3051F HG A1C>EQUAL 7.0%<8.0%: CPT | Performed by: FAMILY MEDICINE

## 2021-09-01 RX ORDER — CARVEDILOL 25 MG/1
25 TABLET ORAL 2 TIMES DAILY WITH MEALS
COMMUNITY
End: 2022-09-27

## 2021-09-01 RX ORDER — METFORMIN HYDROCHLORIDE 500 MG/1
500 TABLET, EXTENDED RELEASE ORAL
Qty: 90 TABLET | Refills: 1 | Status: SHIPPED | OUTPATIENT
Start: 2021-09-01 | End: 2021-11-22 | Stop reason: SDUPTHER

## 2021-09-01 ASSESSMENT — ENCOUNTER SYMPTOMS
COUGH: 0
SHORTNESS OF BREATH: 0
SORE THROAT: 0
WHEEZING: 0
ABDOMINAL PAIN: 0
DIARRHEA: 0
CONSTIPATION: 0
RHINORRHEA: 0

## 2021-09-01 NOTE — PROGRESS NOTES
[address]  PRIMARY CARE  [id number]  [address] [place] [id number]  Dept: [phone]  Dept Fax: [phone]: [phone]     Chief Complaint  Chief Complaint   Patient presents with    Diabetes     follow up    Hyperlipidemia    Hypertension       HPI:  66 y. o.female who presents for the following:      HTN: compliant and tolerating the meds without issues    DM2: a1c 7.4 from 7.2; compliant with meds; compliant with diet; No excessive thirst, urination, or blurry vision. Current diabetes regimen:   - Metformin         Review of Systems   Constitutional: Negative for chills and fever. HENT: Negative for congestion, rhinorrhea and sore throat. Respiratory: Negative for cough, shortness of breath and wheezing. Gastrointestinal: Negative for abdominal pain, constipation and diarrhea. Endocrine: Negative for polydipsia and polyuria. Genitourinary: Negative for dysuria, frequency and urgency. Neurological: Negative for syncope, light-headedness, numbness and headaches. Psychiatric/Behavioral: Negative for sleep disturbance. The patient is not nervous/anxious.         Past Medical History:   Diagnosis Date    Acid reflux     Arthritis     Hyperlipidemia     Hypertension     Spinal stenosis     Type II or unspecified type diabetes mellitus without mention of complication, not stated as uncontrolled      Past Surgical History:   Procedure Laterality Date    CHOLECYSTECTOMY      COLON SURGERY  2010    bowel reduction    EYE SURGERY Right     laser surgery tore retina     KNEE SURGERY Right 09/2013    torn menescus & baker cyst burst    LUMBAR LAMINECTOMY       Social History     Socioeconomic History    Marital status:      Spouse name: Not on file    Number of children: Not on file    Years of education: Not on file    Highest education level: Not on file   Occupational History    Not on file Tobacco Use    Smoking status: Never Smoker    Smokeless tobacco: Never Used   Substance and Sexual Activity    Alcohol use: Yes     Comment: rare    Drug use: No    Sexual activity: Yes   Other Topics Concern    Not on file   Social History Narrative    Not on file     Social Determinants of Health     Financial Resource Strain: Low Risk     Difficulty of Paying Living Expenses: Not hard at all   Food Insecurity: No Food Insecurity    Worried About Running Out of Food in the Last Year: Never true    Marcelo of Food in the Last Year: Never true   Transportation Needs:     Lack of Transportation (Medical):      Lack of Transportation (Non-Medical):    Physical Activity:     Days of Exercise per Week:     Minutes of Exercise per Session:    Stress:     Feeling of Stress :    Social Connections:     Frequency of Communication with Friends and Family:     Frequency of Social Gatherings with Friends and Family:     Attends Lutheran Services:     Active Member of Clubs or Organizations:     Attends Club or Organization Meetings:     Marital Status:    Intimate Partner Violence:     Fear of Current or Ex-Partner:     Emotionally Abused:     Physically Abused:     Sexually Abused:      Family History   Problem Relation Age of Onset    Diabetes Mother     Heart Disease Mother     Stroke Mother       Allergies   Allergen Reactions    Augmentin [Amoxicillin-Pot Clavulanate] Nausea And Vomiting    Flagyl [Metronidazole]     Metronidazole     Sulfa Antibiotics      Current Outpatient Medications   Medication Sig Dispense Refill    carvedilol (COREG) 25 MG tablet Take 25 mg by mouth 2 times daily (with meals)      metFORMIN (GLUCOPHAGE-XR) 500 MG extended release tablet Take 1 tablet by mouth daily (with breakfast) 90 tablet 1    traZODone (DESYREL) 100 MG tablet TAKE 1 TABLET BY MOUTH NIGHTLY (Patient taking differently: Take 50 mg by mouth nightly Take 1 tablet by mouth nightly) 30 tablet 11  fenofibrate (TRICOR) 145 MG tablet TAKE 1 TABLET DAILY      amLODIPine (NORVASC) 5 MG tablet Take 5 mg by mouth daily   3    lisinopril (PRINIVIL;ZESTRIL) 20 MG tablet take 1 tablet by mouth twice daily  3    omeprazole (PRILOSEC) 20 MG capsule Take 20 mg by mouth as needed       Cyanocobalamin (VITAMIN B-12 CR) 1000 MCG TBCR Take  by mouth.  aspirin 81 MG tablet Take 81 mg by mouth daily.  Calcium Carbonate-Vitamin D (CALCIUM + D) 600-200 MG-UNIT TABS Take  by mouth.  pravastatin (PRAVACHOL) 40 MG tablet Take 40 mg by mouth daily.  Accu-Chek FastClix Lancets MISC test THREE TIMES DAILY 102 each 3    blood glucose monitor strips  strip 3    Lancet Device MISC 1 Device by Does not apply route 3 times daily Test 4x/day. 100 each 5    Blood Glucose Monitoring Suppl DONTRELL Use TID; also needs supplies for machine 1 Device 0     No current facility-administered medications for this visit. Vitals:    09/01/21 1039 09/01/21 1040   BP: (!) 142/78 (!) 142/70   Pulse: 62    Temp: 97.2 °F (36.2 °C)    TempSrc: Infrared    SpO2: 98%    Weight: 166 lb (75.3 kg)        Physical exam:  Physical Exam  Vitals reviewed. Constitutional:       General: She is not in acute distress. Appearance: She is well-developed. HENT:      Head: Normocephalic and atraumatic. Mouth/Throat:      Pharynx: No oropharyngeal exudate. Neck:      Thyroid: No thyromegaly. Cardiovascular:      Rate and Rhythm: Normal rate and regular rhythm. Heart sounds: Normal heart sounds. No murmur heard. Pulmonary:      Effort: Pulmonary effort is normal. No respiratory distress. Breath sounds: Normal breath sounds. No wheezing. Abdominal:      General: There is no distension. Palpations: Abdomen is soft. Tenderness: There is no abdominal tenderness. There is no guarding or rebound. Musculoskeletal:      Cervical back: Normal range of motion.    Lymphadenopathy:      Cervical: No cervical adenopathy. Skin:     General: Skin is warm and dry. Neurological:      Mental Status: She is alert and oriented to person, place, and time. Psychiatric:         Behavior: Behavior normal.         Assessment/Plan:  66 y.o. female here mainly for DM2:  - DM2: controlled; will continue on current regimen   - HTN: controlled; will continue on current regimen   - ordered her mamm because she prefers the 3D version. She asked about her PAP results because she hasn't received a call back. She had this at GYN and the sample was inadequate; will plan on repeat in 1 year; she would like to see a different GYN     Diagnosis Orders   1. Type 2 diabetes mellitus with microalbuminuria, without long-term current use of insulin (HCC)  POCT glycosylated hemoglobin (Hb A1C)    metFORMIN (GLUCOPHAGE-XR) 500 MG extended release tablet   2. Encounter for screening mammogram for malignant neoplasm of breast  [facility] Breast Tomosynthesis 3D Imaging   3. Essential hypertension          Return in about 6 months (around 3/1/2022) for DM2.     Pauline King MD

## 2021-09-10 ENCOUNTER — HOSPITAL ENCOUNTER (OUTPATIENT)
Dept: WOMENS IMAGING | Age: 76
Discharge: HOME | End: 2021-09-12
Payer: MEDICARE

## 2021-09-10 DIAGNOSIS — Z12.31 ENCOUNTER FOR SCREENING MAMMOGRAM FOR MALIGNANT NEOPLASM OF BREAST: ICD-10-CM

## 2021-09-10 DIAGNOSIS — Z12.31 ENCOUNTER FOR MAMMOGRAM TO ESTABLISH BASELINE MAMMOGRAM: ICD-10-CM

## 2021-09-10 PROCEDURE — 77063 BREAST TOMOSYNTHESIS BI: CPT

## 2021-09-14 DIAGNOSIS — E11.29 TYPE 2 DIABETES MELLITUS WITH MICROALBUMINURIA, WITHOUT LONG-TERM CURRENT USE OF INSULIN (HCC): ICD-10-CM

## 2021-09-14 DIAGNOSIS — R80.9 TYPE 2 DIABETES MELLITUS WITH MICROALBUMINURIA, WITHOUT LONG-TERM CURRENT USE OF INSULIN (HCC): ICD-10-CM

## 2021-09-15 RX ORDER — BLOOD SUGAR DIAGNOSTIC
STRIP MISCELLANEOUS
Qty: 100 STRIP | Refills: 2 | Status: SHIPPED | OUTPATIENT
Start: 2021-09-15 | End: 2022-05-16

## 2021-09-15 NOTE — TELEPHONE ENCOUNTER
Comments:      Last Office Visit (last PCP visit):   9/1/2021    Next Visit Date:  Future Appointments   Date Time Provider Aneesh Ria   1/12/2022  9:00 AM Gloria Leach MD [facility]       Rx requested:  Requested Prescriptions     Pending Prescriptions Disp Refills    blood glucose test strips (ACCU-CHEK CARLA PLUS) strip [Pharmacy Med Name: Accu-Chek Carla Plus test strips] 100 strip 2     Sig: test THREE TIMES DAILY

## 2021-09-19 DIAGNOSIS — S16.1XXA STRAIN OF NECK MUSCLE, INITIAL ENCOUNTER: Primary | ICD-10-CM

## 2021-09-19 RX ORDER — METHYLPREDNISOLONE 4 MG/1
TABLET ORAL
Qty: 1 KIT | Refills: 0 | Status: SHIPPED | OUTPATIENT
Start: 2021-09-19 | End: 2021-09-25

## 2021-09-19 NOTE — PROGRESS NOTES
Pt called in with much neck pain after working in the yard. Tried massage and without relief. Requesting to try a steroid since it's the weekend and can't come in. Medrol pack sent.

## 2021-10-14 RX ORDER — AMLODIPINE BESYLATE 5 MG/1
5 TABLET ORAL DAILY
Qty: 30 TABLET | Refills: 3 | Status: SHIPPED | OUTPATIENT
Start: 2021-10-14 | End: 2022-02-25

## 2021-10-14 NOTE — TELEPHONE ENCOUNTER
Comments: NA    Last Office Visit (last PCP visit):   9/1/2021    Next Visit Date:  Future Appointments   Date Time Provider Aneesh Rollins   1/12/2022  9:00 AM William Pride MD [facility]       **If hasn't been seen in over a year OR hasn't followed up according to last diabetes/ADHD visit, make appointment for patient before sending refill to provider.     Rx requested:  Requested Prescriptions     Pending Prescriptions Disp Refills    amLODIPine (NORVASC) 5 MG tablet 30 tablet 3     Sig: Take 1 tablet by mouth daily

## 2021-11-22 DIAGNOSIS — E11.29 TYPE 2 DIABETES MELLITUS WITH MICROALBUMINURIA, WITHOUT LONG-TERM CURRENT USE OF INSULIN (HCC): ICD-10-CM

## 2021-11-22 DIAGNOSIS — R80.9 TYPE 2 DIABETES MELLITUS WITH MICROALBUMINURIA, WITHOUT LONG-TERM CURRENT USE OF INSULIN (HCC): ICD-10-CM

## 2021-11-22 RX ORDER — METFORMIN HYDROCHLORIDE 500 MG/1
500 TABLET, EXTENDED RELEASE ORAL
Qty: 90 TABLET | Refills: 1 | Status: SHIPPED | OUTPATIENT
Start: 2021-11-22 | End: 2022-06-29

## 2022-02-23 DIAGNOSIS — F51.01 PRIMARY INSOMNIA: ICD-10-CM

## 2022-02-25 RX ORDER — AMLODIPINE BESYLATE 5 MG/1
5 TABLET ORAL DAILY
Qty: 30 TABLET | Refills: 3 | Status: SHIPPED | OUTPATIENT
Start: 2022-02-25 | End: 2022-05-25 | Stop reason: SDUPTHER

## 2022-02-25 RX ORDER — TRAZODONE HYDROCHLORIDE 100 MG/1
TABLET ORAL
Qty: 30 TABLET | Refills: 11 | Status: SHIPPED | OUTPATIENT
Start: 2022-02-25

## 2022-02-25 NOTE — TELEPHONE ENCOUNTER
Comments:     Last Office Visit (last PCP visit):   9/1/2021    Next Visit Date:  Future Appointments   Date Time Provider Aneesh Rollins   3/9/2022  1:30 PM Osmany Young MD [facility]       **If hasn't been seen in over a year OR hasn't followed up according to last diabetes/ADHD visit, make appointment for patient before sending refill to provider.     Rx requested:  Requested Prescriptions     Pending Prescriptions Disp Refills    traZODone (DESYREL) 100 MG tablet [Pharmacy Med Name: trazodone 100 mg tablet] 30 tablet 11     Sig: TAKE 1 TABLET BY MOUTH NIGHTLY    amLODIPine (NORVASC) 5 MG tablet [Pharmacy Med Name: amlodipine 5 mg tablet] 30 tablet 3     Sig: Take 1 tablet by mouth daily

## 2022-03-11 ENCOUNTER — HOSPITAL ENCOUNTER (OUTPATIENT)
Age: 77
Setting detail: OBSERVATION
Discharge: HOME | End: 2022-03-12
Attending: EMERGENCY MEDICINE | Admitting: INTERNAL MEDICINE
Payer: MEDICARE

## 2022-03-11 ENCOUNTER — NURSE TRIAGE (OUTPATIENT)
Dept: OTHER | Facility: CLINIC | Age: 77
End: 2022-03-11

## 2022-03-11 ENCOUNTER — OFFICE VISIT (OUTPATIENT)
Dept: INTERNAL MEDICINE | Age: 77
End: 2022-03-11
Payer: MEDICARE

## 2022-03-11 ENCOUNTER — APPOINTMENT (OUTPATIENT)
Dept: CT IMAGING | Age: 77
End: 2022-03-11
Payer: MEDICARE

## 2022-03-11 ENCOUNTER — APPOINTMENT (OUTPATIENT)
Dept: GENERAL RADIOLOGY | Age: 77
End: 2022-03-11
Payer: MEDICARE

## 2022-03-11 VITALS
SYSTOLIC BLOOD PRESSURE: 110 MMHG | DIASTOLIC BLOOD PRESSURE: 62 MMHG | BODY MASS INDEX: 27.88 KG/M2 | WEIGHT: 162.4 LBS | OXYGEN SATURATION: 95 % | TEMPERATURE: 97.1 F | HEART RATE: 76 BPM

## 2022-03-11 DIAGNOSIS — N17.9 ACUTE RENAL FAILURE, UNSPECIFIED ACUTE RENAL FAILURE TYPE (HCC): Primary | ICD-10-CM

## 2022-03-11 DIAGNOSIS — R53.1 WEAKNESS: ICD-10-CM

## 2022-03-11 DIAGNOSIS — R53.83 FATIGUE, UNSPECIFIED TYPE: Primary | ICD-10-CM

## 2022-03-11 DIAGNOSIS — E86.0 DEHYDRATION: ICD-10-CM

## 2022-03-11 LAB
ALBUMIN SERPL-MCNC: 4.1 G/DL (ref 3.5–4.6)
ALP BLD-CCNC: 78 U/L (ref 40–130)
ALT SERPL-CCNC: 70 U/L (ref 0–33)
AMORPHOUS: ABNORMAL
ANION GAP SERPL CALCULATED.3IONS-SCNC: 13 MEQ/L (ref 9–15)
AST SERPL-CCNC: 70 U/L (ref 0–35)
BACTERIA: ABNORMAL /HPF
BASOPHILS ABSOLUTE: 0 K/UL (ref 0–0.1)
BASOPHILS RELATIVE PERCENT: 0.6 % (ref 0.1–1.2)
BILIRUB SERPL-MCNC: 0.5 MG/DL (ref 0.2–0.7)
BILIRUBIN URINE: ABNORMAL
BLOOD, URINE: NEGATIVE
BUN BLDV-MCNC: 30 MG/DL (ref 8–23)
CALCIUM SERPL-MCNC: 9.5 MG/DL (ref 8.5–9.9)
CHLORIDE BLD-SCNC: 98 MEQ/L (ref 95–107)
CLARITY: CLEAR
CO2: 25 MEQ/L (ref 20–31)
COLOR: YELLOW
CREAT SERPL-MCNC: 1.43 MG/DL (ref 0.5–0.9)
EKG ATRIAL RATE: 77 BPM
EKG P AXIS: 69 DEGREES
EKG P-R INTERVAL: 152 MS
EKG Q-T INTERVAL: 366 MS
EKG QRS DURATION: 76 MS
EKG QTC CALCULATION (BAZETT): 414 MS
EKG R AXIS: 48 DEGREES
EKG T AXIS: 42 DEGREES
EKG VENTRICULAR RATE: 77 BPM
EOSINOPHILS ABSOLUTE: 0.2 K/UL (ref 0–0.4)
EOSINOPHILS RELATIVE PERCENT: 3.4 % (ref 0.7–5.8)
EPITHELIAL CELLS, UA: ABNORMAL /HPF
GFR AFRICAN AMERICAN: 42.9
GFR NON-AFRICAN AMERICAN: 35.5
GLOBULIN: 3.2 G/DL (ref 2.3–3.5)
GLUCOSE BLD-MCNC: 167 MG/DL (ref 70–99)
GLUCOSE BLD-MCNC: 182 MG/DL (ref 70–99)
GLUCOSE URINE: NEGATIVE MG/DL
HBA1C MFR BLD: 7.7 % (ref 4.8–5.9)
HCT VFR BLD CALC: 36 % (ref 37–47)
HEMOGLOBIN: 12 G/DL (ref 11.2–15.7)
IMMATURE GRANULOCYTES #: 0 K/UL
IMMATURE GRANULOCYTES %: 0.6 %
INFLUENZA A BY PCR: NEGATIVE
INFLUENZA B BY PCR: NEGATIVE
KETONES, URINE: ABNORMAL MG/DL
LEUKOCYTE ESTERASE, URINE: ABNORMAL
LYMPHOCYTES ABSOLUTE: 1.3 K/UL (ref 1.2–3.7)
LYMPHOCYTES RELATIVE PERCENT: 19.4 %
MCH RBC QN AUTO: 28.2 PG (ref 25.6–32.2)
MCHC RBC AUTO-ENTMCNC: 33.3 % (ref 32.2–35.5)
MCV RBC AUTO: 84.7 FL (ref 79.4–94.8)
MONOCYTES ABSOLUTE: 0.6 K/UL (ref 0.2–0.9)
MONOCYTES RELATIVE PERCENT: 8.5 % (ref 4.7–12.5)
NEUTROPHILS ABSOLUTE: 4.4 K/UL (ref 1.6–6.1)
NEUTROPHILS RELATIVE PERCENT: 67.5 % (ref 34–71.1)
NITRITE, URINE: NEGATIVE
PDW BLD-RTO: 12.4 % (ref 11.7–14.4)
PERFORMED ON: ABNORMAL
PH UA: 5 (ref 5–9)
PLATELET # BLD: 165 K/UL (ref 182–369)
POTASSIUM SERPL-SCNC: 4.1 MEQ/L (ref 3.4–4.9)
PROTEIN UA: 100 MG/DL
RBC # BLD: 4.25 M/UL (ref 3.93–5.22)
RBC UA: ABNORMAL /HPF (ref 0–2)
SARS-COV-2, NAAT: NOT DETECTED
SODIUM BLD-SCNC: 136 MEQ/L (ref 135–144)
SPECIFIC GRAVITY UA: >=1.03 (ref 1–1.03)
TOTAL CK: 46 U/L (ref 0–170)
TOTAL PROTEIN: 7.3 G/DL (ref 6.3–8)
TROPONIN: <0.01 NG/ML (ref 0–0.01)
URINE REFLEX TO CULTURE: ABNORMAL
UROBILINOGEN, URINE: 0.2 E.U./DL
WBC # BLD: 6.6 K/UL (ref 4–10)
WBC UA: ABNORMAL /HPF (ref 0–5)

## 2022-03-11 PROCEDURE — 87635 SARS-COV-2 COVID-19 AMP PRB: CPT

## 2022-03-11 PROCEDURE — 2500000003 HC RX 250 WO HCPCS: Performed by: INTERNAL MEDICINE

## 2022-03-11 PROCEDURE — 87086 URINE CULTURE/COLONY COUNT: CPT

## 2022-03-11 PROCEDURE — 80053 COMPREHEN METABOLIC PANEL: CPT

## 2022-03-11 PROCEDURE — 70450 CT HEAD/BRAIN W/O DYE: CPT

## 2022-03-11 PROCEDURE — 96360 HYDRATION IV INFUSION INIT: CPT

## 2022-03-11 PROCEDURE — 96372 THER/PROPH/DIAG INJ SC/IM: CPT

## 2022-03-11 PROCEDURE — 99284 EMERGENCY DEPT VISIT MOD MDM: CPT

## 2022-03-11 PROCEDURE — 36415 COLL VENOUS BLD VENIPUNCTURE: CPT

## 2022-03-11 PROCEDURE — 2580000003 HC RX 258: Performed by: EMERGENCY MEDICINE

## 2022-03-11 PROCEDURE — 2580000003 HC RX 258: Performed by: INTERNAL MEDICINE

## 2022-03-11 PROCEDURE — 6370000000 HC RX 637 (ALT 250 FOR IP): Performed by: INTERNAL MEDICINE

## 2022-03-11 PROCEDURE — 6360000002 HC RX W HCPCS: Performed by: INTERNAL MEDICINE

## 2022-03-11 PROCEDURE — G0378 HOSPITAL OBSERVATION PER HR: HCPCS

## 2022-03-11 PROCEDURE — 71046 X-RAY EXAM CHEST 2 VIEWS: CPT

## 2022-03-11 PROCEDURE — 84484 ASSAY OF TROPONIN QUANT: CPT

## 2022-03-11 PROCEDURE — 96361 HYDRATE IV INFUSION ADD-ON: CPT

## 2022-03-11 PROCEDURE — 81001 URINALYSIS AUTO W/SCOPE: CPT

## 2022-03-11 PROCEDURE — 80074 ACUTE HEPATITIS PANEL: CPT

## 2022-03-11 PROCEDURE — 82550 ASSAY OF CK (CPK): CPT

## 2022-03-11 PROCEDURE — 99213 OFFICE O/P EST LOW 20 MIN: CPT | Performed by: NURSE PRACTITIONER

## 2022-03-11 PROCEDURE — 96365 THER/PROPH/DIAG IV INF INIT: CPT

## 2022-03-11 PROCEDURE — 93005 ELECTROCARDIOGRAM TRACING: CPT

## 2022-03-11 PROCEDURE — 87040 BLOOD CULTURE FOR BACTERIA: CPT

## 2022-03-11 PROCEDURE — 87502 INFLUENZA DNA AMP PROBE: CPT

## 2022-03-11 PROCEDURE — 83036 HEMOGLOBIN GLYCOSYLATED A1C: CPT

## 2022-03-11 PROCEDURE — 85025 COMPLETE CBC W/AUTO DIFF WBC: CPT

## 2022-03-11 RX ORDER — ASPIRIN 81 MG/1
81 TABLET ORAL DAILY
Status: DISCONTINUED | OUTPATIENT
Start: 2022-03-11 | End: 2022-03-12 | Stop reason: HOSPADM

## 2022-03-11 RX ORDER — TRAZODONE HYDROCHLORIDE 50 MG/1
50 TABLET ORAL NIGHTLY PRN
Status: DISCONTINUED | OUTPATIENT
Start: 2022-03-11 | End: 2022-03-12 | Stop reason: HOSPADM

## 2022-03-11 RX ORDER — PRAVASTATIN SODIUM 40 MG
40 TABLET ORAL NIGHTLY
Status: DISCONTINUED | OUTPATIENT
Start: 2022-03-11 | End: 2022-03-12 | Stop reason: HOSPADM

## 2022-03-11 RX ORDER — SODIUM CHLORIDE 0.9 % (FLUSH) 0.9 %
10 SYRINGE (ML) INJECTION PRN
Status: DISCONTINUED | OUTPATIENT
Start: 2022-03-11 | End: 2022-03-12 | Stop reason: HOSPADM

## 2022-03-11 RX ORDER — NICOTINE POLACRILEX 4 MG
15 LOZENGE BUCCAL PRN
Status: DISCONTINUED | OUTPATIENT
Start: 2022-03-11 | End: 2022-03-12 | Stop reason: HOSPADM

## 2022-03-11 RX ORDER — SODIUM CHLORIDE 0.9 % (FLUSH) 0.9 %
10 SYRINGE (ML) INJECTION EVERY 12 HOURS SCHEDULED
Status: DISCONTINUED | OUTPATIENT
Start: 2022-03-11 | End: 2022-03-12 | Stop reason: HOSPADM

## 2022-03-11 RX ORDER — ONDANSETRON 2 MG/ML
4 INJECTION INTRAMUSCULAR; INTRAVENOUS EVERY 6 HOURS PRN
Status: DISCONTINUED | OUTPATIENT
Start: 2022-03-11 | End: 2022-03-12 | Stop reason: HOSPADM

## 2022-03-11 RX ORDER — DEXTROSE MONOHYDRATE 50 MG/ML
100 INJECTION, SOLUTION INTRAVENOUS PRN
Status: DISCONTINUED | OUTPATIENT
Start: 2022-03-11 | End: 2022-03-12 | Stop reason: HOSPADM

## 2022-03-11 RX ORDER — SODIUM CHLORIDE 9 MG/ML
25 INJECTION, SOLUTION INTRAVENOUS PRN
Status: DISCONTINUED | OUTPATIENT
Start: 2022-03-11 | End: 2022-03-12 | Stop reason: HOSPADM

## 2022-03-11 RX ORDER — CARVEDILOL 6.25 MG/1
25 TABLET ORAL 2 TIMES DAILY WITH MEALS
Status: DISCONTINUED | OUTPATIENT
Start: 2022-03-11 | End: 2022-03-12 | Stop reason: HOSPADM

## 2022-03-11 RX ORDER — AMLODIPINE BESYLATE 5 MG/1
5 TABLET ORAL DAILY
Status: DISCONTINUED | OUTPATIENT
Start: 2022-03-11 | End: 2022-03-12 | Stop reason: HOSPADM

## 2022-03-11 RX ORDER — ACETAMINOPHEN 325 MG/1
650 TABLET ORAL EVERY 6 HOURS PRN
Status: DISCONTINUED | OUTPATIENT
Start: 2022-03-11 | End: 2022-03-12 | Stop reason: HOSPADM

## 2022-03-11 RX ORDER — DEXTROSE MONOHYDRATE 25 G/50ML
12.5 INJECTION, SOLUTION INTRAVENOUS PRN
Status: DISCONTINUED | OUTPATIENT
Start: 2022-03-11 | End: 2022-03-12 | Stop reason: HOSPADM

## 2022-03-11 RX ORDER — PANTOPRAZOLE SODIUM 40 MG/1
40 TABLET, DELAYED RELEASE ORAL
Status: DISCONTINUED | OUTPATIENT
Start: 2022-03-12 | End: 2022-03-12 | Stop reason: HOSPADM

## 2022-03-11 RX ORDER — POLYETHYLENE GLYCOL 3350 17 G/17G
17 POWDER, FOR SOLUTION ORAL DAILY PRN
Status: DISCONTINUED | OUTPATIENT
Start: 2022-03-11 | End: 2022-03-12 | Stop reason: HOSPADM

## 2022-03-11 RX ORDER — SODIUM CHLORIDE 9 MG/ML
INJECTION, SOLUTION INTRAVENOUS CONTINUOUS
Status: DISCONTINUED | OUTPATIENT
Start: 2022-03-11 | End: 2022-03-12

## 2022-03-11 RX ORDER — PROMETHAZINE HYDROCHLORIDE 25 MG/1
12.5 TABLET ORAL EVERY 6 HOURS PRN
Status: DISCONTINUED | OUTPATIENT
Start: 2022-03-11 | End: 2022-03-12 | Stop reason: HOSPADM

## 2022-03-11 RX ORDER — 0.9 % SODIUM CHLORIDE 0.9 %
500 INTRAVENOUS SOLUTION INTRAVENOUS ONCE
Status: COMPLETED | OUTPATIENT
Start: 2022-03-11 | End: 2022-03-11

## 2022-03-11 RX ORDER — ACETAMINOPHEN 650 MG/1
650 SUPPOSITORY RECTAL EVERY 6 HOURS PRN
Status: DISCONTINUED | OUTPATIENT
Start: 2022-03-11 | End: 2022-03-12 | Stop reason: HOSPADM

## 2022-03-11 RX ADMIN — ENOXAPARIN SODIUM 40 MG: 40 INJECTION SUBCUTANEOUS at 17:03

## 2022-03-11 RX ADMIN — TRAZODONE HYDROCHLORIDE 50 MG: 50 TABLET ORAL at 20:30

## 2022-03-11 RX ADMIN — DOXYCYCLINE 100 MG: 100 INJECTION, POWDER, LYOPHILIZED, FOR SOLUTION INTRAVENOUS at 16:28

## 2022-03-11 RX ADMIN — SODIUM CHLORIDE 500 ML: 9 INJECTION, SOLUTION INTRAVENOUS at 12:29

## 2022-03-11 RX ADMIN — SODIUM CHLORIDE: 9 INJECTION, SOLUTION INTRAVENOUS at 16:27

## 2022-03-11 RX ADMIN — ACETAMINOPHEN 650 MG: 325 TABLET ORAL at 22:52

## 2022-03-11 RX ADMIN — CARVEDILOL 25 MG: 6.25 TABLET, FILM COATED ORAL at 17:02

## 2022-03-11 RX ADMIN — PRAVASTATIN SODIUM 40 MG: 40 TABLET ORAL at 20:24

## 2022-03-11 ASSESSMENT — ENCOUNTER SYMPTOMS
RHINORRHEA: 1
DIARRHEA: 0
SORE THROAT: 0
COUGH: 0
SHORTNESS OF BREATH: 0
ABDOMINAL PAIN: 0
NAUSEA: 0
COUGH: 0
ABDOMINAL PAIN: 0
VOMITING: 0
SHORTNESS OF BREATH: 0
RHINORRHEA: 0
WHEEZING: 0
BACK PAIN: 0
EYE DISCHARGE: 0
NAUSEA: 1
EYE REDNESS: 0
VOMITING: 0
SORE THROAT: 0

## 2022-03-11 ASSESSMENT — PAIN SCALES - GENERAL
PAINLEVEL_OUTOF10: 0
PAINLEVEL_OUTOF10: 3

## 2022-03-11 NOTE — ED PROVIDER NOTES
[id number] [facility]  EMERGENCY DEPARTMENT ENCOUNTER      Pt Name: Rocío Fernando  MRN: [id number]  [place] 1945  Date of evaluation: 3/11/2022  Provider: Tiffany Paetl DO        HISTORY OF PRESENT ILLNESS    Rocío Fernando is a 64 y.o. female per chart review has ah/o back surgery, acid reflux, arthritis, hyperlipidemia, HTN, L1 fracture. She presents with fatigue, anorexia. She is sleeping 12-15 hours per day. The history is provided by the patient. URI  Presenting symptoms: fatigue, fever and rhinorrhea    Presenting symptoms: no cough, no ear pain and no sore throat    Severity:  Moderate  Onset quality:  Sudden  Timing:  Constant  Progression:  Worsening  Chronicity:  New  Relieved by:  Nothing  Worsened by:  Nothing  Ineffective treatments:  None tried  Associated symptoms: no neck pain    Risk factors: diabetes mellitus, recent illness, recent travel and sick contacts    Risk factors: no chronic cardiac disease, no chronic kidney disease and no chronic respiratory disease             REVIEW OF SYSTEMS       Review of Systems   Constitutional: Positive for fatigue and fever. Negative for chills. HENT: Positive for rhinorrhea. Negative for ear pain and sore throat. Eyes: Negative for discharge and redness. Respiratory: Negative for cough and shortness of breath. Cardiovascular: Negative for chest pain and palpitations. Gastrointestinal: Negative for abdominal pain, nausea and vomiting. Genitourinary: Negative for difficulty urinating and dysuria. Musculoskeletal: Negative for back pain and neck pain. Skin: Negative for rash and wound. Neurological: Negative for dizziness and syncope. Psychiatric/Behavioral: Negative for confusion. The patient is not nervous/anxious. All other systems reviewed and are negative. Except as noted above the remainder of the review of systems was reviewed and negative.        PAST MEDICAL HISTORY     Past Medical History:   Diagnosis Date    Acid reflux     Arthritis     Hyperlipidemia     Hypertension     Spinal stenosis     Type II or unspecified type diabetes mellitus without mention of complication, not stated as uncontrolled          SURGICAL HISTORY       Past Surgical History:   Procedure Laterality Date    CHOLECYSTECTOMY      COLON SURGERY  2010    bowel reduction    EYE SURGERY Right     laser surgery tore retina     KNEE SURGERY Right 09/2013    torn menescus & baker cyst burst    LUMBAR LAMINECTOMY           CURRENT MEDICATIONS       Discharge Medication List as of 3/12/2022 11:31 AM      CONTINUE these medications which have NOT CHANGED    Details   traZODone (DESYREL) 100 MG tablet TAKE 1 TABLET BY MOUTH NIGHTLY, Disp-30 tablet, R-11Normal      amLODIPine (NORVASC) 5 MG tablet Take 1 tablet by mouth daily, Disp-30 tablet, R-3Normal      metFORMIN (GLUCOPHAGE-XR) 500 MG extended release tablet Take 1 tablet by mouth daily (with breakfast), Disp-90 tablet, R-1Normal      blood glucose test strips (ACCU-CHEK SOLANGE PLUS) strip test THREE TIMES DAILY, Disp-100 strip, R-2Normal      carvedilol (COREG) 25 MG tablet Take 25 mg by mouth 2 times daily (with meals)Historical Med      Accu-Chek FastClix Lancets MISC Disp-102 each, R-3, Normaltest THREE TIMES DAILY      fenofibrate (TRICOR) 145 MG tablet TAKE 1 TABLET DAILYHistorical Med      lisinopril (PRINIVIL;ZESTRIL) 20 MG tablet take 1 tablet by mouth twice daily, R-3Historical Med      Lancet Device MISC 3 TIMES DAILY Starting Wed 12/6/2017, Disp-100 each, R-5, NormalTest 4x/day. Blood Glucose Monitoring Suppl DONTRELL Disp-1 Device, R-0, NormalUse TID; also needs supplies for machine      omeprazole (PRILOSEC) 20 MG capsule Take 20 mg by mouth as needed Historical Med      aspirin 81 MG tablet Take 81 mg by mouth daily. pravastatin (PRAVACHOL) 40 MG tablet Take 40 mg by mouth daily.                ALLERGIES     Augmentin [amoxicillin-pot clavulanate], Atorvastatin, Clonidine, Flagyl [metronidazole], Metronidazole, and Sulfa antibiotics    FAMILY HISTORY       Family History   Problem Relation Age of Onset    Diabetes Mother     Heart Disease Mother     Stroke Mother     Breast Cancer Maternal Aunt           SOCIAL HISTORY       Social History     Socioeconomic History    Marital status:      Spouse name: None    Number of children: None    Years of education: None    Highest education level: None   Occupational History    None   Tobacco Use    Smoking status: Never Smoker    Smokeless tobacco: Never Used   Substance and Sexual Activity    Alcohol use: Yes     Comment: rare    Drug use: No    Sexual activity: Yes   Other Topics Concern    None   Social History Narrative    None     Social Determinants of Health     Financial Resource Strain: Low Risk     Difficulty of Paying Living Expenses: Not hard at all   Food Insecurity: No Food Insecurity    Worried About Running Out of Food in the Last Year: Never true    Marcelo of Food in the Last Year: Never true   Transportation Needs:     Lack of Transportation (Medical): Not on file    Lack of Transportation (Non-Medical):  Not on file   Physical Activity:     Days of Exercise per Week: Not on file    Minutes of Exercise per Session: Not on file   Stress:     Feeling of Stress : Not on file   Social Connections:     Frequency of Communication with Friends and Family: Not on file    Frequency of Social Gatherings with Friends and Family: Not on file    Attends Cheondoism Services: Not on file    Active Member of Clubs or Organizations: Not on file    Attends Club or Organization Meetings: Not on file    Marital Status: Not on file   Intimate Partner Violence:     Fear of Current or Ex-Partner: Not on file    Emotionally Abused: Not on file    Physically Abused: Not on file    Sexually Abused: Not on file   Housing Stability:     Unable to Pay for Housing in the Last Year: Not on file    Number of Places Lived in the Last Year: Not on file    Unstable Housing in the Last Year: Not on file         PHYSICAL EXAM       ED Triage Vitals [03/11/22 1119]   BP Temp Temp Source Pulse Resp SpO2 Height Weight   (!) 156/81 98 °F (36.7 °C) Oral 82 16 96 % 5' 3\" (1.6 m) 164 lb (74.4 kg)       Physical Exam  Vitals and nursing note reviewed. Constitutional:       Appearance: Normal appearance. HENT:      Head: Normocephalic and atraumatic. Right Ear: Tympanic membrane normal.      Left Ear: Tympanic membrane normal.      Nose: Nose normal.      Mouth/Throat:      Mouth: Mucous membranes are moist.      Pharynx: Oropharynx is clear. Eyes:      General: Lids are normal.      Extraocular Movements: Extraocular movements intact. Conjunctiva/sclera: Conjunctivae normal.      Pupils: Pupils are equal, round, and reactive to light. Cardiovascular:      Rate and Rhythm: Normal rate and regular rhythm. Pulses: Normal pulses. Heart sounds: Normal heart sounds. Pulmonary:      Effort: Pulmonary effort is normal.      Breath sounds: Normal breath sounds. Abdominal:      General: Abdomen is flat. Bowel sounds are normal.      Palpations: Abdomen is soft. Musculoskeletal:         General: Normal range of motion. Cervical back: Full passive range of motion without pain, normal range of motion and neck supple. Lumbar back: Spasms and tenderness present. Skin:     General: Skin is warm. Capillary Refill: Capillary refill takes less than 2 seconds. Neurological:      General: No focal deficit present. Mental Status: She is alert and oriented to person, place, and time. Deep Tendon Reflexes: Reflexes are normal and symmetric. Psychiatric:         Attention and Perception: Attention and perception normal.         Mood and Affect: Mood normal.         Behavior: Behavior normal. Behavior is cooperative.            LABS:  Labs Reviewed   CBC WITH AUTO DIFFERENTIAL - Abnormal; Notable for the following components:       Result Value    Hematocrit 36.0 (*)     Platelets 597 (*)     All other components within normal limits   COMPREHENSIVE METABOLIC PANEL - Abnormal; Notable for the following components:    Glucose 167 (*)     BUN 30 (*)     CREATININE 1.43 (*)     GFR Non- 35.5 (*)     GFR  42.9 (*)     ALT 70 (*)     AST 70 (*)     All other components within normal limits   URINALYSIS WITH REFLEX TO CULTURE - Abnormal; Notable for the following components:    Protein,  (*)     All other components within normal limits   MICROSCOPIC URINALYSIS - Abnormal; Notable for the following components:    Bacteria, UA FEW (*)     All other components within normal limits   HEMOGLOBIN A1C - Abnormal; Notable for the following components:    Hemoglobin A1C 7.7 (*)     All other components within normal limits   BASIC METABOLIC PANEL W/ REFLEX TO MG FOR LOW K - Abnormal; Notable for the following components:    Glucose 159 (*)     CREATININE 0.92 (*)     GFR Non- 59.0 (*)     All other components within normal limits   CBC WITH AUTO DIFFERENTIAL - Abnormal; Notable for the following components:    RBC 3.71 (*)     Hemoglobin 10.9 (*)     Hematocrit 31.6 (*)     Platelets 867 (*)     All other components within normal limits   HEPATIC FUNCTION PANEL - Abnormal; Notable for the following components:     Total Protein 6.2 (*)     Albumin 3.4 (*)     ALT 46 (*)     AST 58 (*)     All other components within normal limits   POCT GLUCOSE - Abnormal; Notable for the following components:    POC Glucose 182 (*)     All other components within normal limits   POCT GLUCOSE - Abnormal; Notable for the following components:    POC Glucose 150 (*)     All other components within normal limits   RAPID INFLUENZA A/B ANTIGENS   COVID-19, RAPID   CULTURE, BLOOD 1    Narrative:     ORDER#: [id number]                          ORDERED BY: AUNG DE JESUS  SOURCE: Blood COLLECTED:  03/11/22 15:24  ANTIBIOTICS AT MICHA.:                      RECEIVED :  03/11/22 19:23   CULTURE, BLOOD 2    Narrative:     ORDER#: [id number]                          ORDERED BY: Crys Kerr  SOURCE: Blood                              COLLECTED:  03/11/22 15:24  ANTIBIOTICS AT MICHA.:                      RECEIVED :  03/11/22 19:23   CULTURE, URINE    Narrative:     ORDER#: [id number]                          ORDERED BY: Crys Kerr  SOURCE: Urine Clean Catch                  COLLECTED:  03/11/22 15:23  ANTIBIOTICS AT MICHA.:                      RECEIVED :  03/11/22 18:59   TROPONIN   CK   HEPATITIS PANEL, ACUTE   POCT GLUCOSE   POCT GLUCOSE   POCT GLUCOSE         MDM:   Vitals:    Vitals:    03/11/22 1547 03/11/22 2021 03/12/22 0015 03/12/22 0755   BP: (!) 183/78 (!) 164/75  (!) 173/78   Pulse: 89 87  79   Resp: 16 16     Temp: 99.7 °F (37.6 °C)      TempSrc: Oral      SpO2: 97% 95% 96%    Weight:       Height:           MDM  Number of Diagnoses or Management Options  Acute renal failure, unspecified acute renal failure type ([facility] [address]  Dehydration  Diagnosis management comments: Patient presents with fatigue, fever and anorexia for 2 weeks. Labs, CXR, CT brain, EKG ordered. 500 NS IVF ordered. Her BUN/Cr is elevated at 30/1.43  CT brain: no acute changes  CXR: atelecasis vs left lower lobe infiltrate  I spoke with Dr. Arnaldo Calero who accepted the admission. Amount and/or Complexity of Data Reviewed  Clinical lab tests: ordered and reviewed  Tests in the radiology section of CPT®: ordered and reviewed         EKG Interpretation    Interpreted by emergency department physician    Rhythm: normal sinus   Rate: normal  Axis: normal  Ectopy: none  Conduction: normal  ST Segments: nonspecific changes  T Waves: no acute change  Q Waves: none    Clinical Impression: non-specific EKG    SHITAL QUIROS DO     The lab results, radiology and test results were reviewed with the patient and family. The patient will follow up in 2 days with their primary care doctor. If their symptoms change or get worse they will return to the ER. CRITICAL CARE TIME   Total CriticalCare time was 0 minutes, excluding separately reportable procedures. There was a high probability of clinically significant/life threatening deterioration in the patient's condition which required my urgent intervention. PROCEDURES:  Unlessotherwise noted below, none     Procedures      FINAL IMPRESSION      1.  Acute renal failure, unspecified acute renal failure type [facility])    2. Dehydration          DISPOSITION/PLAN   DISPOSITION Admitted 03/11/2022 03:15:36 PM          SHITAL Cruz DO (electronically signed)  Attending Emergency Physician          Campbell Ewing DO  03/15/22 1023

## 2022-03-11 NOTE — TELEPHONE ENCOUNTER
Received call from [address] at [facility] with Red Flag Complaint. Dalila, granddaughter is calling, limited triage, patient not with caller. Subjective: Caller states \"fatigue, nausea, night sweats, low grade fever, decreased appetite, upset stomach\"     Current Symptoms: see above, negative covid test, last night was drenched in sweat    Onset: 1 week ago; worsening    Associated Symptoms: reduced appetite    Pain Severity: unable to assess as patient is not with caller    Temperature: 99.1 this morning by forehead thermometer    What has been tried: nothing per Dalila    LMP: NA Pregnant: NA    Recommended disposition: Go to [facility], Gonzales Randhawa was told walk in/C if no appointments    Care advice provided, patient verbalizes understanding; denies any other questions or concerns; instructed to call back for any new or worsening symptoms. Patient/Caller agrees with recommended disposition; writer provided warm transfer to Dora Lu at [facility] for appointment scheduling     Attention Provider: Thank you for allowing me to participate in the care of your patient. The patient was connected to triage in response to information provided to the ECC/PSC. Please do not respond through this encounter as the response is not directed to a shared pool.         Reason for Disposition   MODERATE weakness (i.e., interferes with work, school, normal activities) and cause unknown (Exceptions: weakness with acute minor illness, or weakness from poor fluid intake)    Protocols used: WEAKNESS (GENERALIZED) AND FATIGUE-ADULT-OH

## 2022-03-11 NOTE — PROGRESS NOTES
Subjective:      Patient ID: Herbert Mcginnis is a 64 y.o. female who presents today for:  Chief Complaint   Patient presents with    Fatigue     weakness, nausea, loss of appetite, x 5 days       Patient presents to the office with her . History obtained by the patient and her . Fatigue  This is a new problem. Episode onset: 5 days ago. The problem occurs constantly. The problem has been gradually worsening. Associated symptoms include chills, fatigue, myalgias, nausea and weakness. Pertinent negatives include no abdominal pain, arthralgias, chest pain, congestion, coughing, fever, headaches, sore throat or vomiting. Associated symptoms comments: Denies loss of sense of taste or smell, no known exposure to covid. Nothing aggravates the symptoms. She has tried acetaminophen and NSAIDs (trazodone) for the symptoms. The treatment provided mild relief. Past Medical History:   Diagnosis Date    Acid reflux     Arthritis     Hyperlipidemia     Hypertension     Spinal stenosis     Type II or unspecified type diabetes mellitus without mention of complication, not stated as uncontrolled      Past Surgical History:   Procedure Laterality Date    CHOLECYSTECTOMY      COLON SURGERY  2010    bowel reduction    EYE SURGERY Right     laser surgery tore retina     KNEE SURGERY Right 09/2013    torn menescus & baker cyst burst    LUMBAR LAMINECTOMY       Family History   Problem Relation Age of Onset    Diabetes Mother     Heart Disease Mother     Stroke Mother     Breast Cancer Maternal Aunt      Allergies   Allergen Reactions    Augmentin [Amoxicillin-Pot Clavulanate] Nausea And Vomiting    Atorvastatin Other (See Comments)    Clonidine Other (See Comments)    Flagyl [Metronidazole]     Metronidazole     Sulfa Antibiotics          Review of Systems   Constitutional: Positive for appetite change, chills and fatigue. Negative for fever.    HENT: Negative for congestion, rhinorrhea and sore additional evaluation and treatment. There is concern for dehydration. Patient's  will transport her at this time. I have reviewed and updated the electronic medical record. Return if symptoms worsen or fail to improve, for follow up with PCP.     SURINDER Lua - NP

## 2022-03-11 NOTE — ED NOTES
Dr. Juan Latham is speaking to Dr. Jeancarlos Herrera regarding this patient.   Blanca Quinones Son  03/11/22 9081

## 2022-03-11 NOTE — ED NOTES
Called Supervisor, she will call me back with a bed.   Alicia Payment COSTA Byrd [place]  03/11/22 1082

## 2022-03-12 VITALS
RESPIRATION RATE: 16 BRPM | DIASTOLIC BLOOD PRESSURE: 78 MMHG | SYSTOLIC BLOOD PRESSURE: 173 MMHG | HEIGHT: 63 IN | WEIGHT: 164 LBS | HEART RATE: 79 BPM | BODY MASS INDEX: 29.06 KG/M2 | TEMPERATURE: 99.7 F | OXYGEN SATURATION: 96 %

## 2022-03-12 LAB
ALBUMIN SERPL-MCNC: 3.4 G/DL (ref 3.5–4.6)
ALP BLD-CCNC: 68 U/L (ref 40–130)
ALT SERPL-CCNC: 46 U/L (ref 0–33)
ANION GAP SERPL CALCULATED.3IONS-SCNC: 10 MEQ/L (ref 9–15)
AST SERPL-CCNC: 58 U/L (ref 0–35)
BASOPHILS ABSOLUTE: 0 K/UL (ref 0–0.1)
BASOPHILS RELATIVE PERCENT: 0.7 % (ref 0.1–1.2)
BILIRUB SERPL-MCNC: 0.4 MG/DL (ref 0.2–0.7)
BILIRUBIN DIRECT: <0.2 MG/DL (ref 0–0.4)
BILIRUBIN, INDIRECT: ABNORMAL MG/DL (ref 0–0.6)
BUN BLDV-MCNC: 20 MG/DL (ref 8–23)
CALCIUM SERPL-MCNC: 8.7 MG/DL (ref 8.5–9.9)
CHLORIDE BLD-SCNC: 103 MEQ/L (ref 95–107)
CO2: 25 MEQ/L (ref 20–31)
CREAT SERPL-MCNC: 0.92 MG/DL (ref 0.5–0.9)
EOSINOPHILS ABSOLUTE: 0.3 K/UL (ref 0–0.4)
EOSINOPHILS RELATIVE PERCENT: 4.5 % (ref 0.7–5.8)
GFR AFRICAN AMERICAN: >60
GFR NON-AFRICAN AMERICAN: 59
GLUCOSE BLD-MCNC: 150 MG/DL (ref 70–99)
GLUCOSE BLD-MCNC: 159 MG/DL (ref 70–99)
HCT VFR BLD CALC: 31.6 % (ref 37–47)
HEMOGLOBIN: 10.9 G/DL (ref 11.2–15.7)
IMMATURE GRANULOCYTES #: 0 K/UL
IMMATURE GRANULOCYTES %: 0.7 %
LYMPHOCYTES ABSOLUTE: 2.3 K/UL (ref 1.2–3.7)
LYMPHOCYTES RELATIVE PERCENT: 41.7 %
MCH RBC QN AUTO: 29.4 PG (ref 25.6–32.2)
MCHC RBC AUTO-ENTMCNC: 34.5 % (ref 32.2–35.5)
MCV RBC AUTO: 85.2 FL (ref 79.4–94.8)
MONOCYTES ABSOLUTE: 0.7 K/UL (ref 0.2–0.9)
MONOCYTES RELATIVE PERCENT: 12.3 % (ref 4.7–12.5)
NEUTROPHILS ABSOLUTE: 2.2 K/UL (ref 1.6–6.1)
NEUTROPHILS RELATIVE PERCENT: 40.1 % (ref 34–71.1)
PDW BLD-RTO: 12.4 % (ref 11.7–14.4)
PERFORMED ON: ABNORMAL
PLATELET # BLD: 177 K/UL (ref 182–369)
POTASSIUM REFLEX MAGNESIUM: 3.6 MEQ/L (ref 3.4–4.9)
RBC # BLD: 3.71 M/UL (ref 3.93–5.22)
SODIUM BLD-SCNC: 138 MEQ/L (ref 135–144)
TOTAL PROTEIN: 6.2 G/DL (ref 6.3–8)
WBC # BLD: 5.5 K/UL (ref 4–10)

## 2022-03-12 PROCEDURE — G0378 HOSPITAL OBSERVATION PER HR: HCPCS

## 2022-03-12 PROCEDURE — 6370000000 HC RX 637 (ALT 250 FOR IP): Performed by: INTERNAL MEDICINE

## 2022-03-12 PROCEDURE — 96372 THER/PROPH/DIAG INJ SC/IM: CPT

## 2022-03-12 PROCEDURE — 2580000003 HC RX 258: Performed by: INTERNAL MEDICINE

## 2022-03-12 PROCEDURE — 6360000002 HC RX W HCPCS: Performed by: INTERNAL MEDICINE

## 2022-03-12 PROCEDURE — 80048 BASIC METABOLIC PNL TOTAL CA: CPT

## 2022-03-12 PROCEDURE — 36415 COLL VENOUS BLD VENIPUNCTURE: CPT

## 2022-03-12 PROCEDURE — 96366 THER/PROPH/DIAG IV INF ADDON: CPT

## 2022-03-12 PROCEDURE — 2500000003 HC RX 250 WO HCPCS: Performed by: INTERNAL MEDICINE

## 2022-03-12 PROCEDURE — 85025 COMPLETE CBC W/AUTO DIFF WBC: CPT

## 2022-03-12 PROCEDURE — 80076 HEPATIC FUNCTION PANEL: CPT

## 2022-03-12 RX ORDER — LISINOPRIL 10 MG/1
10 TABLET ORAL 2 TIMES DAILY
Status: DISCONTINUED | OUTPATIENT
Start: 2022-03-12 | End: 2022-03-12 | Stop reason: HOSPADM

## 2022-03-12 RX ADMIN — AMLODIPINE BESYLATE 5 MG: 5 TABLET ORAL at 07:55

## 2022-03-12 RX ADMIN — ENOXAPARIN SODIUM 40 MG: 40 INJECTION SUBCUTANEOUS at 07:57

## 2022-03-12 RX ADMIN — PANTOPRAZOLE SODIUM 40 MG: 40 TABLET, DELAYED RELEASE ORAL at 05:25

## 2022-03-12 RX ADMIN — ASPIRIN 81 MG: 81 TABLET, COATED ORAL at 07:57

## 2022-03-12 RX ADMIN — Medication 10 ML: at 07:59

## 2022-03-12 RX ADMIN — CARVEDILOL 25 MG: 6.25 TABLET, FILM COATED ORAL at 07:56

## 2022-03-12 RX ADMIN — DOXYCYCLINE 100 MG: 100 INJECTION, POWDER, LYOPHILIZED, FOR SOLUTION INTRAVENOUS at 04:39

## 2022-03-12 NOTE — H&P
mg by mouth 2 times daily (with meals)   Yes Historical Provider, MD   Accu-Chek FastClix Lancets MISC test THREE TIMES DAILY 3/26/21  Yes Bal Sanon MD   fenofibrate (TRICOR) 145 MG tablet TAKE 1 TABLET DAILY 8/19/20  Yes Historical Provider, MD   lisinopril (PRINIVIL;ZESTRIL) 20 MG tablet take 1 tablet by mouth twice daily 11/7/17  Yes Historical Provider, MD   Lancet Device MISC 1 Device by Does not apply route 3 times daily Test 4x/day. 12/6/17  Yes Bal Sanon MD   Blood Glucose Monitoring Suppl DONTRELL Use TID; also needs supplies for machine 12/6/17  Yes Bal Sanon MD   omeprazole (PRILOSEC) 20 MG capsule Take 20 mg by mouth as needed    Yes Historical Provider, MD   aspirin 81 MG tablet Take 81 mg by mouth daily. Yes Historical Provider, MD   pravastatin (PRAVACHOL) 40 MG tablet Take 40 mg by mouth daily. Yes Historical Provider, MD   blood glucose test strips (ACCU-CHEK SOLANGE PLUS) strip test THREE TIMES DAILY 9/15/21   Bal Sanon MD       Allergies:  Augmentin [amoxicillin-pot clavulanate], Atorvastatin, Clonidine, Flagyl [metronidazole], Metronidazole, and Sulfa antibiotics    Social History:   TOBACCO:   reports that she has never smoked. She has never used smokeless tobacco.  ETOH:   reports current alcohol use. Family History:       Problem Relation Age of Onset    Diabetes Mother     Heart Disease Mother     Stroke Mother     Breast Cancer Maternal Aunt        REVIEW OF SYSTEMS:  Ten systems reviewed and negative except for stated in HPI    Physical Exam:    Vitals: BP (!) 173/78   Pulse 79   Temp 99.7 °F (37.6 °C) (Oral)   Resp 16   Ht 5' 3\" (1.6 m)   Wt 164 lb (74.4 kg)   SpO2 96%   BMI 29.05 kg/m²   General appearance: alert, appears stated age and cooperative, no apparent distress. Dry skin in the buccal mucosa. Skin: Skin color, texture, turgor normal. No rashes or lesions  HEENT: Head: Normocephalic, no lesions, without obvious abnormality.   Neck: no adenopathy, no carotid bruit, no JVD, supple, symmetrical, trachea midline and thyroid not enlarged, symmetric, no tenderness/mass/nodules  Lungs: clear to auscultation bilaterally  Heart: regular rate and rhythm, S1, S2 normal, no murmur, click, rub or gallop  Abdomen: soft, non-tender; bowel sounds normal; no masses,  no organomegaly  Extremities: extremities normal, atraumatic, no cyanosis or edema  Neurologic: Mental status: Alert, oriented, thought content appropriate     Recent Labs     03/11/22  1157 03/12/22  0636   WBC 6.6 5.5   HGB 12.0 10.9*   * 177*     Recent Labs     03/11/22  1157 03/12/22  0638    138   K 4.1 3.6   CL 98 103   CO2 25 25   BUN 30* 20   CREATININE 1.43* 0.92*   GLUCOSE 167* 159*   AST 70* 58*   ALT 70* 46*   BILITOT 0.5 0.4   ALKPHOS 78 68     Troponin T:   Recent Labs     03/11/22  1227   TROPONINI <0.010       ABGs: No results found for: PHART, PO2ART, KLN2BGR  INR: No results for input(s): INR in the last 72 hours. URINALYSIS:  Recent Labs     03/11/22  1139   COLORU Yellow   PHUR 5.0   WBCUA 6-9   RBCUA 0-2   BACTERIA FEW*   CLARITYU Clear   SPECGRAV >=1.030   LEUKOCYTESUR Small   UROBILINOGEN 0.2   BILIRUBINUR Small   BLOODU Negative   GLUCOSEU Negative   AMORPHOUS 1+     -----------------------------------------------------------------   XR CHEST (2 VW)    Result Date: 3/11/2022  COMPARISON: July 8, 2016 HISTORY: fatigue TECHNIQUE: AP view FINDINGS: Haziness is seen in the left base. The right hemidiaphragm is slightly elevated. The cardiac silhouette is within normal limits of size. Calcifications are seen in the thoracic aorta. The bony structures are osteopenic. A compression fracture is seen at the L1 level. Degenerative changes are seen in the spine. Degenerative changes are also seen in the visualized portion of both shoulders. Surgical clips are visualized in the right lower quadrant thought to represent cholecystectomy. .     Atelectasis or mild infiltrate in the left lower lobe. CT HEAD WO CONTRAST    Result Date: 3/11/2022  HISTORY: Aiden Hampton is a Female of 64 years age with history of weakness, nausea, loss of appetite. COMPARISON: None available. TECHNIQUE:  Spiral CT examination of the brain was performed without intravenous contrast.  Images were obtained from the base of the skull up to the convexities in axial slices, and then examined in the blood, brain parenchymal and bony windows. FINDINGS:  No acute changes are noted. There is no evidence for mass, mass effect or midline shift. No abnormal extra-axial fluid collections are appreciated. Age-appropriate cortical volume loss is seen. There are patchy areas of hypoattenuation in a sub-cortical, central white and periventricular distribution consistent with nonspecific ischemic small vessel disease. There are no acute parenchymal alterations, blood byproducts or abnormal extracerebral fluid. The calvarium is grossly intact. The visualized paranasal sinuses are unremarkable. 1.Age-appropriate cortical atrophy and periventricular microangiopathy. No wide vessel territory stroke is seen however if persistent recommend follow-up with CT of the brain are MRI in 24-48 hours. 2.No acute hemorrhage or extra-axial fluid collection. All CT scans at this facility use dose modulation, iterative reconstruction, and/or weight based dosing when appropriate to reduce radiation dose to as low as reasonably achievable. Assessment and Plan     *Acute kidney failure, indeterminate etiology but could be related to hemodynamic changes, volume loss and dehydration. Responded well to IV fluid infusion. I could not exclude other possible etiologies. *Elevated liver enzymes, no abdominal pain, unknown etiology. Hepatitis panel is ordered however patient decided to leave AMA prior to results become available. *Diabetes. Metformin was placed on hold due to CARMEL.     *Sweating, not feeling well, feeling sick, could be related to above but could not exclude other possible etiologies. Blood culture is pending. Patient decided to leave AMA. *Anemia, unknown etiology. *Multiple other abnormalities not listed above. Plan:  I had accepted to observe the patient in the hospital for 24 to 48 hours. Acute kidney failure had responded to IV fluid. Still I do not have concrete confirmative etiology of her symptoms and acute kidney failure other than what is listed above. Patient is adamant about going home today. I strongly recommended the patient in front of her  and her nurse to stay for at least another day to monitor her symptoms and the condition and to proceed with additional work-up, investigation and therapeutic intervention as deemed to be appropriate. Patient understood my recommendation very well. Patient understood implication of going home prematurely against my advice. Regardless she decided to leave AMA excepting all potential negative implications that could come out of her decision to leave AMA. Her  was sitting at the bedside chair supporting her decision to go home AMA. I instructed the patient and her  to follow-up with the PCP and return back to the emergency room if her symptoms are worse. I instructed the patient to ask her PCP to obtain pending lab report that is not available yet.       Patient Active Problem List   Diagnosis Code    Type 2 diabetes mellitus with microalbuminuria ([facility] [address] E11.29, R80.9    Acid reflux K21.9    Hyperlipidemia E78.5    Spinal stenosis M48.00    Hypertension I10    ST (sore throat) J02.9    Dehydration E86.0    CARMEL (acute kidney injury) ([facility] [address] N17.9       Jeanette Nino MD, MD  Admitting Hospitalist    TTS: 85mins where I focused more than 75% of my attention on rendering care, and planning treatment course for this patient, in addition to talking to RN team, mid levels, consulting with other physicians and following up on labs and imaging. High Risk Readmission Screening Tool Score Noted.      Emergency Contact:

## 2022-03-12 NOTE — PROGRESS NOTES
Pt expressed want to leave today after being informed it was against medical advice. Pt understood that she still needed to take lisinoril 10mg and  Metformin, and said she will take when she gets home. Pt it alert and aware of her surroundings and willing to follow up as needed. Patient  Left with  and son.

## 2022-03-12 NOTE — DISCHARGE SUMMARY
Presenting final diagnosis: Not final yet given the fact that the patient decided to leave AMA before final determination has been accomplished. *Acute kidney failure, indeterminate etiology but could be related to hemodynamic changes, volume loss and dehydration. Responded well to IV fluid infusion. I could not exclude other possible etiologies.     *Elevated liver enzymes, no abdominal pain, unknown etiology. Hepatitis panel is ordered however patient decided to leave AMA prior to results become available.     *Diabetes. Metformin was placed on hold due to CARMEL.     *Sweating, not feeling well, feeling sick, could be related to above but could not exclude other possible etiologies. Blood culture is pending. Patient decided to leave AMA.    *Anemia, unknown etiology.     *Multiple other abnormalities not listed above. Discharge instructions:    Patient was instructed to return back to the emergency room if her symptoms are worse. Patient was instructed to follow-up with her PCP and ask her PCP to obtain record completely to follow-up on needed work-up, investigation and therapeutic intervention that could be done in the outpatient setting. Discharge medications:  Could not be addressed. Patient left AMA before I was able to perform appropriate discharge tasks. Patient left AMA assuming risk and the responsibility for any negative consequences that could result out of her decision.

## 2022-03-13 LAB
HAV IGM SER IA-ACNC: NONREACTIVE
HEPATITIS B CORE IGM ANTIBODY: NONREACTIVE
HEPATITIS B SURFACE ANTIGEN: NONREACTIVE
HEPATITIS C ANTIBODY: NONREACTIVE
URINE CULTURE, ROUTINE: NORMAL

## 2022-03-14 ENCOUNTER — TELEPHONE (OUTPATIENT)
Dept: FAMILY MEDICINE CLINIC | Age: 77
End: 2022-03-14

## 2022-03-14 NOTE — TELEPHONE ENCOUNTER
----- Message from Joey Rajan sent at 3/14/2022  4:36 PM EDT -----  Subject: Message to Provider    QUESTIONS  Information for Provider? Pt rescheduled her appt to earlier in the day   because her  needs to have bloodwork done. She thought the appt was   at [place] but saw that it was at [place]. Will her  be able to   have the bloodwork done there? If not, she will need to change her appt   again on 3/14. Could you please reach out to her to advise?   ---------------------------------------------------------------------------  --------------  CALL BACK INFO  What is the best way for the office to contact you? OK to leave message on   voicemail  Preferred Call Back Phone Number? [phone]  ---------------------------------------------------------------------------  --------------  SCRIPT ANSWERS  Relationship to Patient?  Self

## 2022-03-16 LAB
BLOOD CULTURE, ROUTINE: NORMAL
CULTURE, BLOOD 2: NORMAL

## 2022-03-21 ENCOUNTER — HOSPITAL ENCOUNTER (OUTPATIENT)
Age: 77
Setting detail: SPECIMEN
Discharge: HOME | End: 2022-03-21
Payer: MEDICARE

## 2022-03-21 ENCOUNTER — OFFICE VISIT (OUTPATIENT)
Dept: FAMILY MEDICINE CLINIC | Age: 77
End: 2022-03-21
Payer: MEDICARE

## 2022-03-21 VITALS
SYSTOLIC BLOOD PRESSURE: 128 MMHG | HEART RATE: 76 BPM | BODY MASS INDEX: 29 KG/M2 | WEIGHT: 157.6 LBS | OXYGEN SATURATION: 97 % | TEMPERATURE: 97.1 F | HEIGHT: 62 IN | DIASTOLIC BLOOD PRESSURE: 76 MMHG

## 2022-03-21 DIAGNOSIS — E11.29 TYPE 2 DIABETES MELLITUS WITH MICROALBUMINURIA, WITHOUT LONG-TERM CURRENT USE OF INSULIN (HCC): ICD-10-CM

## 2022-03-21 DIAGNOSIS — R80.9 TYPE 2 DIABETES MELLITUS WITH MICROALBUMINURIA, WITHOUT LONG-TERM CURRENT USE OF INSULIN (HCC): ICD-10-CM

## 2022-03-21 DIAGNOSIS — Z00.00 ENCOUNTER FOR ANNUAL WELLNESS EXAM IN MEDICARE PATIENT: Primary | ICD-10-CM

## 2022-03-21 DIAGNOSIS — Z00.00 MEDICARE ANNUAL WELLNESS VISIT, SUBSEQUENT: ICD-10-CM

## 2022-03-21 DIAGNOSIS — Z00.00 ENCOUNTER FOR ANNUAL WELLNESS EXAM IN MEDICARE PATIENT: ICD-10-CM

## 2022-03-21 LAB
ALBUMIN SERPL-MCNC: 4.1 G/DL (ref 3.5–4.6)
ALP BLD-CCNC: 56 U/L (ref 40–130)
ALT SERPL-CCNC: 18 U/L (ref 0–33)
ANION GAP SERPL CALCULATED.3IONS-SCNC: 11 MEQ/L (ref 9–15)
AST SERPL-CCNC: 20 U/L (ref 0–35)
BILIRUB SERPL-MCNC: 0.4 MG/DL (ref 0.2–0.7)
BUN BLDV-MCNC: 37 MG/DL (ref 8–23)
CALCIUM SERPL-MCNC: 9.6 MG/DL (ref 8.5–9.9)
CHLORIDE BLD-SCNC: 97 MEQ/L (ref 95–107)
CO2: 27 MEQ/L (ref 20–31)
CREAT SERPL-MCNC: 1.29 MG/DL (ref 0.5–0.9)
GFR AFRICAN AMERICAN: 48.3
GFR NON-AFRICAN AMERICAN: 39.9
GLOBULIN: 3 G/DL (ref 2.3–3.5)
GLUCOSE BLD-MCNC: 139 MG/DL (ref 70–99)
HCT VFR BLD CALC: 35.4 % (ref 37–47)
HEMOGLOBIN: 11.6 G/DL (ref 12–16)
MCH RBC QN AUTO: 28.4 PG (ref 27–31.3)
MCHC RBC AUTO-ENTMCNC: 32.9 % (ref 33–37)
MCV RBC AUTO: 86.5 FL (ref 82–100)
PDW BLD-RTO: 13.3 % (ref 11.5–14.5)
PLATELET # BLD: 415 K/UL (ref 130–400)
POTASSIUM SERPL-SCNC: 5 MEQ/L (ref 3.4–4.9)
RBC # BLD: 4.1 M/UL (ref 4.2–5.4)
SODIUM BLD-SCNC: 135 MEQ/L (ref 135–144)
TOTAL PROTEIN: 7.1 G/DL (ref 6.3–8)
WBC # BLD: 16.3 K/UL (ref 4.8–10.8)

## 2022-03-21 PROCEDURE — 3051F HG A1C>EQUAL 7.0%<8.0%: CPT | Performed by: FAMILY MEDICINE

## 2022-03-21 PROCEDURE — 80053 COMPREHEN METABOLIC PANEL: CPT

## 2022-03-21 PROCEDURE — 99214 OFFICE O/P EST MOD 30 MIN: CPT | Performed by: FAMILY MEDICINE

## 2022-03-21 PROCEDURE — 36415 COLL VENOUS BLD VENIPUNCTURE: CPT | Performed by: FAMILY MEDICINE

## 2022-03-21 PROCEDURE — 85027 COMPLETE CBC AUTOMATED: CPT

## 2022-03-21 PROCEDURE — G0439 PPPS, SUBSEQ VISIT: HCPCS | Performed by: FAMILY MEDICINE

## 2022-03-21 RX ORDER — LISINOPRIL 10 MG/1
10 TABLET ORAL 2 TIMES DAILY
COMMUNITY
End: 2022-06-29

## 2022-03-21 RX ORDER — OXYCODONE AND ACETAMINOPHEN 7.5; 325 MG/1; MG/1
1 TABLET ORAL EVERY 4 HOURS PRN
COMMUNITY

## 2022-03-21 ASSESSMENT — PATIENT HEALTH QUESTIONNAIRE - PHQ9
SUM OF ALL RESPONSES TO PHQ QUESTIONS 1-9: 0
SUM OF ALL RESPONSES TO PHQ QUESTIONS 1-9: 0
1. LITTLE INTEREST OR PLEASURE IN DOING THINGS: 0
SUM OF ALL RESPONSES TO PHQ QUESTIONS 1-9: 0
2. FEELING DOWN, DEPRESSED OR HOPELESS: 0
SUM OF ALL RESPONSES TO PHQ QUESTIONS 1-9: 0
SUM OF ALL RESPONSES TO PHQ9 QUESTIONS 1 & 2: 0

## 2022-03-21 ASSESSMENT — ENCOUNTER SYMPTOMS
ABDOMINAL PAIN: 0
SORE THROAT: 0
WHEEZING: 0
COUGH: 0
RHINORRHEA: 0
DIARRHEA: 0
CONSTIPATION: 0
SHORTNESS OF BREATH: 0

## 2022-03-21 ASSESSMENT — LIFESTYLE VARIABLES
HOW OFTEN DO YOU HAVE A DRINK CONTAINING ALCOHOL: MONTHLY OR LESS
HOW MANY STANDARD DRINKS CONTAINING ALCOHOL DO YOU HAVE ON A TYPICAL DAY: 1 OR 2

## 2022-03-21 NOTE — PROGRESS NOTES
[address] [facility] [address] [place] [address] PRIMARY CARE  Sue Henry [phone] [id number]  Dept: [phone]  Dept Fax: : [phone]     Chief Complaint  Chief Complaint   Patient presents with    Medicare AWV     Pt states she will get her lipid done in August with her heart doctor.  Diabetes     6 month follow up, Last a1c 7.7 on 3/11/22    Acute Renal Failure     DOMINICK on 11, dehydrated, Nauseated, seen her back doctor recenlty and was put on a steroid pack and percocet        HPI:  64 y. o.female who presents for the following:      DM2: a1c 7.7 (3/11/2022) from 7.4; compliant with meds; compliant with diet; No excessive thirst, urination, or blurry vision. Current diabetes regimen:   - Metformin    R upper back pain: seeing back doctor now; planning MRI at some point    [facility] f/u: see for constitutional symptoms; Head CT neg; CXR with LLL changes and was given doxy; Labs showed CARMEL and she was given IVFs with improvement; labs showed elevated ALT/AST improved after fluids; anemia after fluids; Left AMA before evaluation could be completed; Metformin held in the hospital; nausea resolved; tolerating normal PO now. Breathing. Lisinopril reduced to 10mg BID    Hospital course 3/11/22: The patient came in with 1 week history of a progressive fatigue, chills, sweats, not feeling well. Patient was found to have acute kidney failure in the emergency room department. I had accepted to observe her on the medical floor and see if anything else need to be done. This morning patient is feeling better. She is requesting to be discharged home. She is not willing to stay any longer although she continues to be symptomatic.   Presenting final diagnosis: Not final yet given the fact that the patient decided to leave AMA before final determination has been accomplished.     *Acute kidney failure, indeterminate etiology but could be related to hemodynamic changes, volume loss and dehydration.  Responded well to IV fluid infusion.  I could not exclude other possible etiologies.     *Elevated liver enzymes, no abdominal pain, unknown etiology.  Hepatitis panel is ordered however patient decided to leave AMA prior to results become available.     *Diabetes.  Metformin was placed on hold due to CARMEL.     *Sweating, not feeling well, feeling sick, could be related to above but could not exclude other possible etiologies.  Blood culture is pending.  Patient decided to leave AMA.    *Anemia, unknown etiology.     *Multiple other abnormalities not listed above. Review of Systems   Constitutional: Negative for chills and fever. HENT: Negative for congestion, rhinorrhea and sore throat. Respiratory: Negative for cough, shortness of breath and wheezing. Gastrointestinal: Negative for abdominal pain, constipation and diarrhea. Endocrine: Negative for polydipsia and polyuria. Genitourinary: Negative for dysuria, frequency and urgency. Neurological: Negative for syncope, light-headedness, numbness and headaches. Psychiatric/Behavioral: Negative for sleep disturbance. The patient is not nervous/anxious.         Past Medical History:   Diagnosis Date    Acid reflux     Arthritis     Hyperlipidemia     Hypertension     Spinal stenosis     Type II or unspecified type diabetes mellitus without mention of complication, not stated as uncontrolled      Past Surgical History:   Procedure Laterality Date    CHOLECYSTECTOMY      COLON SURGERY  2010    bowel reduction    EYE SURGERY Right     laser surgery tore retina     KNEE SURGERY Right 09/2013    torn carlyle & baker cyst burst    LUMBAR LAMINECTOMY       Social History     Socioeconomic History    Marital status:      Spouse name: Not on file    Number of children: Not on file    Years of education: Not on file    Highest education level: Not on file   Occupational History    Not on file   Tobacco Use    Smoking status: Never Smoker    Smokeless tobacco: Never Used   Substance and Sexual Activity    Alcohol use: Yes     Comment: rare    Drug use: No    Sexual activity: Yes   Other Topics Concern    Not on file   Social History Narrative    Not on file     Social Determinants of Health     Financial Resource Strain: Low Risk     Difficulty of Paying Living Expenses: Not hard at all   Food Insecurity: No Food Insecurity    Worried About Running Out of Food in the Last Year: Never true    Marcelo of Food in the Last Year: Never true   Transportation Needs:     Lack of Transportation (Medical): Not on file    Lack of Transportation (Non-Medical):  Not on file   Physical Activity: Inactive    Days of Exercise per Week: 0 days    Minutes of Exercise per Session: 0 min   Stress:     Feeling of Stress : Not on file   Social Connections:     Frequency of Communication with Friends and Family: Not on file    Frequency of Social Gatherings with Friends and Family: Not on file    Attends Taoist Services: Not on file    Active Member of [address] [place] [address] or Organizations: Not on file    Attends Club or Organization Meetings: Not on file    Marital Status: Not on file   Intimate Partner Violence:     Fear of Current or Ex-Partner: Not on file    Emotionally Abused: Not on file    Physically Abused: Not on file    Sexually Abused: Not on file   Housing Stability:     Unable to Pay for Housing in the Last Year: Not on file    Number of [place] in the Last Year: Not on file    Unstable Housing in the Last Year: Not on file     Family History   Problem Relation Age of Onset    Diabetes Mother     Heart Disease Mother     Stroke Mother     Breast Cancer Maternal Aunt       Allergies   Allergen Reactions    Augmentin [Amoxicillin-Pot Clavulanate] Nausea And Vomiting    Atorvastatin Other (See Comments)    Clonidine Other (See Comments)    Flagyl [Metronidazole]     Metronidazole  Sulfa Antibiotics      Current Outpatient Medications   Medication Sig Dispense Refill    oxyCODONE-acetaminophen (PERCOCET) 7.5-325 MG per tablet Take 1 tablet by mouth every 4 hours as needed for Pain.  lisinopril (PRINIVIL;ZESTRIL) 10 MG tablet Take 10 mg by mouth in the morning and at bedtime      traZODone (DESYREL) 100 MG tablet TAKE 1 TABLET BY MOUTH NIGHTLY 30 tablet 11    amLODIPine (NORVASC) 5 MG tablet Take 1 tablet by mouth daily 30 tablet 3    metFORMIN (GLUCOPHAGE-XR) 500 MG extended release tablet Take 1 tablet by mouth daily (with breakfast) (Patient taking differently: Take 500 mg by mouth daily (before dinner) ) 90 tablet 1    blood glucose test strips (ACCU-CHEK SOLANGE PLUS) strip test THREE TIMES DAILY 100 strip 2    carvedilol (COREG) 25 MG tablet Take 25 mg by mouth 2 times daily (with meals)      Accu-Chek FastClix Lancets MISC test THREE TIMES DAILY 102 each 3    fenofibrate (TRICOR) 145 MG tablet TAKE 1 TABLET DAILY      Lancet Device MISC 1 Device by Does not apply route 3 times daily Test 4x/day. 100 each 5    Blood Glucose Monitoring Suppl DONTRELL Use TID; also needs supplies for machine 1 Device 0    omeprazole (PRILOSEC) 20 MG capsule Take 20 mg by mouth as needed       aspirin 81 MG tablet Take 81 mg by mouth daily.  pravastatin (PRAVACHOL) 40 MG tablet Take 40 mg by mouth daily. No current facility-administered medications for this visit. Vitals:    03/21/22 0945   BP: 128/76   Site: Left Upper Arm   Position: Sitting   Cuff Size: Large Adult   Pulse: 76   Temp: 97.1 °F (36.2 °C)   TempSrc: Infrared   SpO2: 97%   Weight: 157 lb 9.6 oz (71.5 kg)   Height: 5' 2\" (1.575 m)       Physical exam:  Physical Exam  Vitals reviewed. Constitutional:       General: She is not in acute distress. Appearance: She is well-developed. HENT:      Head: Normocephalic and atraumatic. Mouth/Throat:      Pharynx: No oropharyngeal exudate.    Neck: Thyroid: No thyromegaly. Cardiovascular:      Rate and Rhythm: Normal rate and regular rhythm. Heart sounds: Normal heart sounds. No murmur heard. Pulmonary:      Effort: Pulmonary effort is normal. No respiratory distress. Breath sounds: Normal breath sounds. No wheezing. Abdominal:      General: There is no distension. Palpations: Abdomen is soft. Tenderness: There is no abdominal tenderness. There is no guarding or rebound. Musculoskeletal:      Cervical back: Normal range of motion. Lymphadenopathy:      Cervical: No cervical adenopathy. Skin:     General: Skin is warm and dry. Neurological:      Mental Status: She is alert and oriented to person, place, and time. Psychiatric:         Behavior: Behavior normal.         Assessment/Plan:  64 y.o. female here mainly for hosp f/u and DM2:  - DM2: controlled; will continue on current regimen with metformin  - HTN: controlled; will continue on current regimen with the 10mg BID lisinopril  - Hosp f/u: checking renal/liver function and to see if the anemia is stabilized  - she seemed a little depressed today but cooperative; asked open-ended questions on how she is doing and says she is doing well; planning her next [place] trip this coming December; prefers f/u in 6mo for the DM2    ADDENDUM: Labs showed improved anemia/liver function. Labs showed recurrence of the CARMEL so she was encouraged fluids. WBC elevated so started doxy again given her possible LLL pneumonia on CXR with plan for repeat CBCD/BMP/CXR later this week. There was an incidental L1 compression fx on CXR but she had no complaint of back pain    ADDENDUM: She prefers not to see a kidney doctor. Encouraged fluids and avoiding NSAIDs. Will stop the metformin and she will follow the sugar. Will see me back in clinic in 1mo for new BMP and A1c. We may start a GLP1 at that time     Diagnosis Orders   1.  Encounter for annual wellness exam in Medicare patient  CBC Comprehensive Metabolic Panel   2. Type 2 diabetes mellitus with microalbuminuria, without long-term current use of insulin (HCC)     3. Medicare annual wellness visit, subsequent          Return in 6 months (on 9/21/2022) for DM2. Peg Bahena MD   Medicare Annual Wellness Visit    Maryjane Elliott is here for Medicare AWV (Pt states she will get her lipid done in August with her heart doctor.), Diabetes (6 month follow up, Last a1c 7.7 on 3/11/22), and Acute Renal Failure [facility] on 11, dehydrated, Nauseated, seen her back doctor recenlty and was put on a steroid pack and percocet )    Assessment & Plan   Encounter for annual wellness exam in Medicare patient  -     CBC; Future  -     Comprehensive Metabolic Panel; Future  Type 2 diabetes mellitus with microalbuminuria, without long-term current use of insulin ([address]  Medicare annual wellness visit, subsequent      Recommendations for Preventive Services Due: see orders and patient instructions/AVS.  Recommended screening schedule for the next 5-10 years is provided to the patient in written form: see Patient Instructions/AVS.     Return in 6 months (on 9/21/2022) for DM2. Subjective       Patient's complete Health Risk Assessment and screening values have been reviewed and are found in Flowsheets. The following problems were reviewed today and where indicated follow up appointments were made and/or referrals ordered.     Positive Risk Factor Screenings with Interventions:               General Health and ACP:  General  In general, how would you say your health is?: Good  In the past 7 days, have you experienced any of the following: New or Increased Pain, New or Increased Fatigue, Loneliness, Social Isolation, Stress or Anger?: No  Do you get the social and emotional support that you need?: Yes  Do you have a Living Will?: Yes    Advance Directives     Power of  Living Will ACP-Advance Directive ACP-Power of     Not on File Not on File Not on File Not on File      General Health Risk Interventions:  · NA    Health Habits/Nutrition:     Physical Activity: Inactive    Days of Exercise per Week: 0 days    Minutes of Exercise per Session: 0 min     Have you lost any weight without trying in the past 3 months?: No  Body mass index: (!) 28.82  Have you seen the dentist within the past year?: Yes    Health Habits/Nutrition Interventions:  · already working on wt and diet     Safety:  Do you have working smoke detectors?: Yes  Do you have any tripping hazards - loose or unsecured carpets or rugs?: (!) Yes  Do you have any tripping hazards - clutter in doorways, halls, or stairs?: No  Do you have either shower bars, grab bars, non-slip mats or non-slip surfaces in your shower or bathtub?: Yes  Do all of your stairways have a railing or banister?: Yes  Do you always fasten your seatbelt when you are in a car?: Yes    Safety Interventions:  · Home safety tips provided           Objective   Vitals:    03/21/22 0945   BP: 128/76   Site: Left Upper Arm   Position: Sitting   Cuff Size: Large Adult   Pulse: 76   Temp: 97.1 °F (36.2 °C)   TempSrc: Infrared   SpO2: 97%   Weight: 157 lb 9.6 oz (71.5 kg)   Height: 5' 2\" (1.575 m)      Body mass index is 28.83 kg/m². Allergies   Allergen Reactions    Augmentin [Amoxicillin-Pot Clavulanate] Nausea And Vomiting    Atorvastatin Other (See Comments)    Clonidine Other (See Comments)    Flagyl [Metronidazole]     Metronidazole     Sulfa Antibiotics      Prior to Visit Medications    Medication Sig Taking? Authorizing Provider   oxyCODONE-acetaminophen (PERCOCET) 7.5-325 MG per tablet Take 1 tablet by mouth every 4 hours as needed for Pain.  Yes Historical Provider, MD   lisinopril (PRINIVIL;ZESTRIL) 10 MG tablet Take 10 mg by mouth in the morning and at bedtime Yes Historical Provider, MD   traZODone (DESYREL) 100 MG tablet TAKE 1 TABLET BY MOUTH NIGHTLY Yes Kathi Heredia MD   amLODIPine (NORVASC) 5 MG tablet Take 1 tablet by mouth daily Yes Bal Sanon MD   metFORMIN (GLUCOPHAGE-XR) 500 MG extended release tablet Take 1 tablet by mouth daily (with breakfast)  Patient taking differently: Take 500 mg by mouth daily (before dinner)  Yes Bal Sanon MD   blood glucose test strips (ACCU-CHEK SOLANGE PLUS) strip test THREE TIMES DAILY Yes Bal Sanon MD   carvedilol (COREG) 25 MG tablet Take 25 mg by mouth 2 times daily (with meals) Yes Historical Provider, MD   Accu-Chek FastClix Lancets MISC test THREE TIMES DAILY Yes Bal Sanon MD   fenofibrate (TRICOR) 145 MG tablet TAKE 1 TABLET DAILY Yes Historical Provider, MD   Lancet Device MISC 1 Device by Does not apply route 3 times daily Test 4x/day. Yes Bal Sanon MD   Blood Glucose Monitoring Suppl DONTRELL Use TID; also needs supplies for machine Yes Bal Sanon MD   omeprazole (PRILOSEC) 20 MG capsule Take 20 mg by mouth as needed  Yes Historical Provider, MD   aspirin 81 MG tablet Take 81 mg by mouth daily. Yes Historical Provider, MD   pravastatin (PRAVACHOL) 40 MG tablet Take 40 mg by mouth daily.    Yes Historical Provider, MD Cash (Including outside providers/suppliers regularly involved in providing care):   Patient Care Team:  Bal Sanon MD as PCP - General (Family Medicine)  Bal Sanon MD as PCP - [facility] Empaneled Provider  Huy Garcia MD (Cardiology)  Jennifer Ferrer DO (Internal Medicine Cardiovascular Disease)    Reviewed and updated this visit:  Tobacco  Allergies  Meds  Med Hx  Surg Hx  Soc Hx  Fam Hx

## 2022-03-21 NOTE — PATIENT INSTRUCTIONS
Personalized Preventive Plan for Teto Johnston - 3/21/2022  Medicare offers a range of preventive health benefits. Some of the tests and screenings are paid in full while other may be subject to a deductible, co-insurance, and/or copay. Some of these benefits include a comprehensive review of your medical history including lifestyle, illnesses that may run in your family, and various assessments and screenings as appropriate. After reviewing your medical record and screening and assessments performed today your provider may have ordered immunizations, labs, imaging, and/or referrals for you. A list of these orders (if applicable) as well as your Preventive Care list are included within your After Visit Summary for your review. Other Preventive Recommendations:    · A preventive eye exam performed by an eye specialist is recommended every 1-2 years to screen for glaucoma; cataracts, macular degeneration, and other eye disorders. · A preventive dental visit is recommended every 6 months. · Try to get at least 150 minutes of exercise per week or 10,000 steps per day on a pedometer . · Order or download the FREE \"Exercise & Physical Activity: Your Everyday Guide\" from [employer]. Call 5-455.532.9507 or search [employer] online. · You need 2107-8816 mg of calcium and 4972-9838 IU of vitamin D per day. It is possible to meet your calcium requirement with diet alone, but a vitamin D supplement is usually necessary to meet this goal.  · When exposed to the sun, use a sunscreen that protects against both UVA and UVB radiation with an SPF of 30 or greater. Reapply every 2 to 3 hours or after sweating, drying off with a towel, or swimming. · Always wear a seat belt when traveling in a car. Always wear a helmet when riding a bicycle or motorcycle.

## 2022-03-22 DIAGNOSIS — N17.9 AKI (ACUTE KIDNEY INJURY) (HCC): ICD-10-CM

## 2022-03-22 DIAGNOSIS — J18.9 PNEUMONIA OF LEFT LOWER LOBE DUE TO INFECTIOUS ORGANISM: Primary | ICD-10-CM

## 2022-03-22 RX ORDER — DOXYCYCLINE HYCLATE 100 MG
100 TABLET ORAL 2 TIMES DAILY
Qty: 20 TABLET | Refills: 0 | Status: SHIPPED | OUTPATIENT
Start: 2022-03-22 | End: 2022-04-01

## 2022-03-25 ENCOUNTER — HOSPITAL ENCOUNTER (OUTPATIENT)
Age: 77
Setting detail: SPECIMEN
Discharge: HOME | End: 2022-03-25
Payer: MEDICARE

## 2022-03-25 ENCOUNTER — NURSE ONLY (OUTPATIENT)
Dept: FAMILY MEDICINE CLINIC | Age: 77
End: 2022-03-25
Payer: MEDICARE

## 2022-03-25 DIAGNOSIS — J18.9 PNEUMONIA OF LEFT LOWER LOBE DUE TO INFECTIOUS ORGANISM: ICD-10-CM

## 2022-03-25 DIAGNOSIS — N17.9 AKI (ACUTE KIDNEY INJURY) (HCC): ICD-10-CM

## 2022-03-25 PROCEDURE — 80048 BASIC METABOLIC PNL TOTAL CA: CPT

## 2022-03-25 PROCEDURE — 85025 COMPLETE CBC W/AUTO DIFF WBC: CPT

## 2022-03-25 PROCEDURE — 36415 COLL VENOUS BLD VENIPUNCTURE: CPT | Performed by: FAMILY MEDICINE

## 2022-03-25 NOTE — PROGRESS NOTES
Pt is here today for her lab work. Pt tolerated the procedure well and made aware we will contact her when the results come back in on Monday. Before she got her labs drawn she asked if she being doing them due to being on a ABX? I told her I would ask Reny Smith and get back to her. Spoke with Reny Smith and he said yes she can get her labs drawn. Will call to make the patient aware.

## 2022-03-26 LAB
ANION GAP SERPL CALCULATED.3IONS-SCNC: 9 MEQ/L (ref 9–15)
ANISOCYTOSIS: ABNORMAL
BASOPHILS ABSOLUTE: 0.2 K/UL (ref 0–0.2)
BASOPHILS RELATIVE PERCENT: 2 %
BUN BLDV-MCNC: 33 MG/DL (ref 8–23)
BURR CELLS: ABNORMAL
CALCIUM SERPL-MCNC: 9.6 MG/DL (ref 8.5–9.9)
CHLORIDE BLD-SCNC: 97 MEQ/L (ref 95–107)
CO2: 28 MEQ/L (ref 20–31)
CREAT SERPL-MCNC: 1.32 MG/DL (ref 0.5–0.9)
EOSINOPHILS ABSOLUTE: 0.4 K/UL (ref 0–0.7)
EOSINOPHILS RELATIVE PERCENT: 3 %
GFR AFRICAN AMERICAN: 47.1
GFR NON-AFRICAN AMERICAN: 38.9
GLUCOSE BLD-MCNC: 152 MG/DL (ref 70–99)
HCT VFR BLD CALC: 36.4 % (ref 37–47)
HEMOGLOBIN: 11.9 G/DL (ref 12–16)
LYMPHOCYTES ABSOLUTE: 3 K/UL (ref 1–4.8)
LYMPHOCYTES RELATIVE PERCENT: 23 %
MCH RBC QN AUTO: 28.3 PG (ref 27–31.3)
MCHC RBC AUTO-ENTMCNC: 32.8 % (ref 33–37)
MCV RBC AUTO: 86.4 FL (ref 82–100)
MICROCYTES: ABNORMAL
MONOCYTES ABSOLUTE: 0.5 K/UL (ref 0.2–0.8)
MONOCYTES RELATIVE PERCENT: 4.2 %
MYELOCYTE PERCENT: 2 %
NEUTROPHILS ABSOLUTE: 7.9 K/UL (ref 1.4–6.5)
NEUTROPHILS RELATIVE PERCENT: 63 %
PDW BLD-RTO: 13.3 % (ref 11.5–14.5)
PLATELET # BLD: 363 K/UL (ref 130–400)
PLATELET SLIDE REVIEW: ADEQUATE
POIKILOCYTES: ABNORMAL
POTASSIUM SERPL-SCNC: 4.7 MEQ/L (ref 3.4–4.9)
PROLYMPHOCYTES: 2 %
PROMYELOCYTES PERCENT: 1 %
RBC # BLD: 4.22 M/UL (ref 4.2–5.4)
SMUDGE CELLS: 0.8
SODIUM BLD-SCNC: 134 MEQ/L (ref 135–144)
WBC # BLD: 11.9 K/UL (ref 4.8–10.8)

## 2022-03-29 ENCOUNTER — TELEPHONE (OUTPATIENT)
Dept: FAMILY MEDICINE CLINIC | Age: 77
End: 2022-03-29

## 2022-03-29 NOTE — TELEPHONE ENCOUNTER
Pt is aware of the results and recommendations. She would like to focus on one thing at a time for now. Once she is feeling better she will let you know what she would like to do with the kidney doctor. Patient states she was talking with her daughter and granddaughter are nurse and they said that prednisone could elevate her kidneys and creatine. She would like to know if this could be what is happening? She would also like to know how bad her kidneys are doing? Patient see's her back doctor tomorrow at 1:15 pm and it is ok to LM on her VM with Dr. Raysa Bello recommendation. She will reach back out if she has any questions.

## 2022-03-29 NOTE — TELEPHONE ENCOUNTER
----- Message from Marella Sandifer, MD sent at 3/28/2022  5:09 PM EDT -----  The kidneys aren't back to normal. We should either follow this over time or consider sending you to a kidney doctor. I think we should avoid contrast with the MRI she has planned.

## 2022-03-30 ENCOUNTER — TELEPHONE (OUTPATIENT)
Dept: INTERNAL MEDICINE | Age: 77
End: 2022-03-30

## 2022-03-30 NOTE — TELEPHONE ENCOUNTER
We'll need to find a time to place her on the schedule for a 3/31 visit with me. I'm thinking between 11 and noon would be good. We might need 30 min.

## 2022-03-30 NOTE — TELEPHONE ENCOUNTER
I don't think the prednisone is having an affect on the kidneys. The kidneys aren't bad but their function is much less than what is normal for you. We'll need to avoid meds that can stress them so stop the metformin for now. Avoid pain relievers like ibuprofen, naproxen which are over the counter. OK to take tylenol when needed. Drinking plenty of fluids. Your sugar may go up after stopping the metformin so keep an eye on it. I'd like to meet in a month to check on you and the blood sugar and the kidneys again.

## 2022-03-30 NOTE — TELEPHONE ENCOUNTER
Pt aware of below advice and recommendations, says she will call to schedule when it's closer to the time you requested. Sent [employer] message with below information as well.

## 2022-03-31 NOTE — TELEPHONE ENCOUNTER
Called and spoke with the patients son and he wanted to let you know his mom is getting worse and he will call back to make an appointment once his sister comes over to check on the patient. ( sister is a oncology nurse) He is thinking the patient may need to be admitted to the hospital. States he will call back and let us know.

## 2022-04-10 PROBLEM — E86.0 DEHYDRATION: Status: RESOLVED | Noted: 2022-03-11 | Resolved: 2022-04-10

## 2022-05-16 DIAGNOSIS — E11.29 TYPE 2 DIABETES MELLITUS WITH MICROALBUMINURIA, WITHOUT LONG-TERM CURRENT USE OF INSULIN (HCC): ICD-10-CM

## 2022-05-16 DIAGNOSIS — R80.9 TYPE 2 DIABETES MELLITUS WITH MICROALBUMINURIA, WITHOUT LONG-TERM CURRENT USE OF INSULIN (HCC): ICD-10-CM

## 2022-05-16 RX ORDER — BLOOD SUGAR DIAGNOSTIC
STRIP MISCELLANEOUS
Qty: 100 STRIP | Refills: 2 | Status: SHIPPED | OUTPATIENT
Start: 2022-05-16 | End: 2022-09-27

## 2022-05-16 RX ORDER — LANCETS
EACH MISCELLANEOUS
Qty: 102 EACH | Refills: 3 | Status: SHIPPED | OUTPATIENT
Start: 2022-05-16

## 2022-05-16 NOTE — TELEPHONE ENCOUNTER
Comments:     Last Office Visit (last PCP visit):   3/21/2022    Next Visit Date:  Future Appointments   Date Time Provider Aneesh Rollins   9/22/2022  9:00 AM Pauline King MD [facility]       **If hasn't been seen in over a year OR hasn't followed up according to last diabetes/ADHD visit, make appointment for patient before sending refill to provider.     Rx requested:  Requested Prescriptions     Pending Prescriptions Disp Refills    Accu-Chek FastClix Lancets [facility] [Pharmacy Med Name: Accu-Chek Fastclix Lancet Drum]  3     Sig: test THREE TIMES DAILY    ACCU-CHEK SOLANGE PLUS strip [Pharmacy Med Name: Accu-Chek Solange Plus test strips] 100 strip 2     Sig: test THREE TIMES DAILY

## 2022-05-25 ENCOUNTER — TELEPHONE (OUTPATIENT)
Dept: FAMILY MEDICINE CLINIC | Age: 77
End: 2022-05-25

## 2022-05-25 RX ORDER — AMLODIPINE BESYLATE 5 MG/1
5 TABLET ORAL DAILY
Qty: 90 TABLET | Refills: 1 | Status: SHIPPED | OUTPATIENT
Start: 2022-05-25 | End: 2022-07-14

## 2022-05-25 NOTE — TELEPHONE ENCOUNTER
----- Message from Aliyah Oreilly sent at 5/25/2022  1:29 PM EDT -----  Subject: Refill Request    QUESTIONS  Name of Medication? amLODIPine (NORVASC) 5 MG tablet  Patient-reported dosage and instructions? 5 MG tablet take two once a day  How many days do you have left? 8  Preferred Pharmacy? [id number]  Pharmacy phone number (if available)? [phone]  Additional Information for Provider? Patient would like to have a 90 day   supply of this please. Thank you.  ---------------------------------------------------------------------------  --------------  CALL BACK INFO  What is the best way for the office to contact you? OK to leave message on   voicemail  Preferred Call Back Phone Number? [phone]  ---------------------------------------------------------------------------  --------------  SCRIPT ANSWERS  Relationship to Patient?  Self

## 2022-05-25 NOTE — TELEPHONE ENCOUNTER
Comments:     Last Office Visit (last PCP visit):   3/21/2022    Next Visit Date:  Future Appointments   Date Time Provider Aneesh Rollins   9/22/2022  9:00 AM Kizzy Ponce MD [facility]       **If hasn't been seen in over a year OR hasn't followed up according to last diabetes/ADHD visit, make appointment for patient before sending refill to provider.     Rx requested:  Requested Prescriptions     Pending Prescriptions Disp Refills    amLODIPine (NORVASC) 5 MG tablet 90 tablet 1     Sig: Take 1 tablet by mouth daily

## 2022-06-29 ENCOUNTER — OFFICE VISIT (OUTPATIENT)
Dept: INTERNAL MEDICINE | Age: 77
End: 2022-06-29
Payer: MEDICARE

## 2022-06-29 VITALS
OXYGEN SATURATION: 98 % | SYSTOLIC BLOOD PRESSURE: 138 MMHG | HEIGHT: 62 IN | DIASTOLIC BLOOD PRESSURE: 64 MMHG | BODY MASS INDEX: 30.03 KG/M2 | WEIGHT: 163.2 LBS | TEMPERATURE: 98 F | HEART RATE: 73 BPM

## 2022-06-29 DIAGNOSIS — R10.31 RIGHT GROIN PAIN: Primary | ICD-10-CM

## 2022-06-29 DIAGNOSIS — I10 PRIMARY HYPERTENSION: ICD-10-CM

## 2022-06-29 DIAGNOSIS — M79.89 LEG SWELLING: ICD-10-CM

## 2022-06-29 PROCEDURE — 1123F ACP DISCUSS/DSCN MKR DOCD: CPT | Performed by: FAMILY MEDICINE

## 2022-06-29 PROCEDURE — 99213 OFFICE O/P EST LOW 20 MIN: CPT | Performed by: FAMILY MEDICINE

## 2022-06-29 SDOH — ECONOMIC STABILITY: FOOD INSECURITY: WITHIN THE PAST 12 MONTHS, THE FOOD YOU BOUGHT JUST DIDN'T LAST AND YOU DIDN'T HAVE MONEY TO GET MORE.: NEVER TRUE

## 2022-06-29 SDOH — ECONOMIC STABILITY: FOOD INSECURITY: WITHIN THE PAST 12 MONTHS, YOU WORRIED THAT YOUR FOOD WOULD RUN OUT BEFORE YOU GOT MONEY TO BUY MORE.: NEVER TRUE

## 2022-06-29 ASSESSMENT — ENCOUNTER SYMPTOMS
SHORTNESS OF BREATH: 0
ABDOMINAL PAIN: 0
SORE THROAT: 0
CONSTIPATION: 0
COUGH: 0
WHEEZING: 0
DIARRHEA: 0
RHINORRHEA: 0
BACK PAIN: 1

## 2022-06-29 ASSESSMENT — SOCIAL DETERMINANTS OF HEALTH (SDOH): HOW HARD IS IT FOR YOU TO PAY FOR THE VERY BASICS LIKE FOOD, HOUSING, MEDICAL CARE, AND HEATING?: NOT HARD AT ALL

## 2022-06-29 NOTE — PROGRESS NOTES
[address]  PRIMARY CARE  [address]  [address] [place] [address]  Dept: [phone]  Dept Fax: [phone]: [phone]     Chief Complaint  Chief Complaint   Patient presents with    Hypertension     Medication change in the [facility] Joint Swelling     at night time, was told previously it could be amlodipine.  Back Pain     had COVID in 1/2022, while in [place]. Had an epidural pain block last Friday, with relief. Having to put cushion on chairs due to groin pain. HPI:  64 y. o.female who presents for the following:      HTN: amlodipine replace the lisinporil during last hospitalization; getting LE swelling late in the day but not uncomfortable; just wanted to talk about it; no sores    R groin pain: feels like it \"catches\"; better with movement or sitting on cushions; has chronic back pain improved by injection recently; no swelling or bulge in the area    Review of Systems   Constitutional: Negative for chills and fever. HENT: Negative for congestion, rhinorrhea and sore throat. Respiratory: Negative for cough, shortness of breath and wheezing. Cardiovascular: Positive for leg swelling. Gastrointestinal: Negative for abdominal pain, constipation and diarrhea. Endocrine: Negative for polydipsia and polyuria. Genitourinary: Negative for dysuria, frequency and urgency. Musculoskeletal: Positive for arthralgias and back pain. Neurological: Negative for syncope, light-headedness, numbness and headaches. Psychiatric/Behavioral: Negative for sleep disturbance. The patient is not nervous/anxious.         Past Medical History:   Diagnosis Date    Acid reflux     Arthritis     Hyperlipidemia     Hypertension     Spinal stenosis     Type II or unspecified type diabetes mellitus without mention of complication, not stated as uncontrolled      Past Surgical History:   Procedure Laterality Date    CHOLECYSTECTOMY      COLON SURGERY  2010    bowel reduction    EYE SURGERY Right     laser surgery tore retina     KNEE SURGERY Right 09/2013    torn menescus & baker cyst burst    LUMBAR LAMINECTOMY       Social History     Socioeconomic History    Marital status:      Spouse name: Not on file    Number of children: Not on file    Years of education: Not on file    Highest education level: Not on file   Occupational History    Not on file   Tobacco Use    Smoking status: Never Smoker    Smokeless tobacco: Never Used   Substance and Sexual Activity    Alcohol use: Yes     Comment: rare    Drug use: No    Sexual activity: Yes   Other Topics Concern    Not on file   Social History Narrative    Not on file     Social Determinants of Health     Financial Resource Strain: Low Risk     Difficulty of Paying Living Expenses: Not hard at all   Food Insecurity: No Food Insecurity    Worried About [employer] in the Last Year: Never true    [employer] [employer] in the Last Year: Never true   Transportation Needs:     Lack of Transportation (Medical): Not on file    Lack of Transportation (Non-Medical):  Not on file   Physical Activity: Inactive    Days of Exercise per Week: 0 days    Minutes of Exercise per Session: 0 min   Stress:     Feeling of Stress : Not on file   Social Connections:     Frequency of Communication with Friends and Family: Not on file    Frequency of Social Gatherings with Friends and Family: Not on file    Attends Rastafari Services: Not on file    Active Member of Clubs or Organizations: Not on file    Attends Club or Organization Meetings: Not on file    Marital Status: Not on file   Intimate Partner Violence:     Fear of Current or Ex-Partner: Not on file    Emotionally Abused: Not on file    Physically Abused: Not on file    Sexually Abused: Not on file   Housing Stability:     Unable to Pay for Housing in the Last Year: Not on file    Number of Places Lived in the Last Year: Not on file    Unstable Housing in the Last Year: Not on file     Family History   Problem Relation Age of Onset    Diabetes Mother     Heart Disease Mother     Stroke Mother     Breast Cancer Maternal Aunt       Allergies   Allergen Reactions    Augmentin [Amoxicillin-Pot Clavulanate] Nausea And Vomiting    Atorvastatin Other (See Comments)    Clonidine Other (See Comments)    Flagyl [Metronidazole]     Metronidazole     Sulfa Antibiotics      Current Outpatient Medications   Medication Sig Dispense Refill    amLODIPine (NORVASC) 5 MG tablet Take 1 tablet by mouth daily (Patient taking differently: Take 10 mg by mouth daily ) 90 tablet 1    Accu-Chek FastClix Lancets MISC test THREE TIMES DAILY 102 each 3    ACCU-CHEK SOLANGE PLUS strip test THREE TIMES DAILY 100 strip 2    oxyCODONE-acetaminophen (PERCOCET) 7.5-325 MG per tablet Take 1 tablet by mouth every 4 hours as needed for Pain.  traZODone (DESYREL) 100 MG tablet TAKE 1 TABLET BY MOUTH NIGHTLY 30 tablet 11    carvedilol (COREG) 25 MG tablet Take 25 mg by mouth 2 times daily (with meals)      fenofibrate (TRICOR) 145 MG tablet TAKE 1 TABLET DAILY      Lancet Device MISC 1 Device by Does not apply route 3 times daily Test 4x/day. 100 each 5    Blood Glucose Monitoring Suppl DONTRELL Use TID; also needs supplies for machine 1 Device 0    omeprazole (PRILOSEC) 20 MG capsule Take 20 mg by mouth as needed       aspirin 81 MG tablet Take 81 mg by mouth daily.  pravastatin (PRAVACHOL) 40 MG tablet Take 40 mg by mouth daily. No current facility-administered medications for this visit. Vitals:    06/29/22 1258   BP: 138/64   Site: Left Upper Arm   Position: Sitting   Cuff Size: Large Adult   Pulse: 73   Temp: 98 °F (36.7 °C)   TempSrc: Infrared   SpO2: 98%   Weight: 163 lb 3.2 oz (74 kg)   Height: 5' 2\" (1.575 m)       Physical exam:  Physical Exam  Vitals reviewed.    Constitutional:       General: She is not in acute distress. Appearance: She is well-developed. HENT:      Head: Normocephalic and atraumatic. Cardiovascular:      Rate and Rhythm: Normal rate. Pulmonary:      Effort: Pulmonary effort is normal. No respiratory distress. Musculoskeletal:      Cervical back: Normal range of motion. Right lower leg: No edema. Left lower leg: No edema. Skin:     General: Skin is warm and dry. Neurological:      Mental Status: She is alert and oriented to person, place, and time. Psychiatric:         Behavior: Behavior normal.         Assessment/Plan:  64 y.o. female here mainly for multiple issues:  - R groin pain: she thinks this is an inguinal hernia; CT 3/2022 didn't mention this; she declined an exam of the area; she would like to think about this and consider a referral to gen surg for eval in the future; ok to call in for this  - LE swelling: benign swelling; likely just venous stasis worsening by the amlodipine     Diagnosis Orders   1. Right groin pain     2. Leg swelling     3. Primary hypertension          Return if symptoms worsen or fail to improve.     Melissa Botello MD

## 2022-07-13 DIAGNOSIS — I10 PRIMARY HYPERTENSION: Primary | ICD-10-CM

## 2022-07-13 NOTE — TELEPHONE ENCOUNTER
Comments:     Last Office Visit (last PCP visit):   6/29/2022    Next Visit Date:  Future Appointments   Date Time Provider Aneesh Rollins   9/22/2022  9:00 AM Priya Hanson MD [facility]       **If hasn't been seen in over a year OR hasn't followed up according to last diabetes/ADHD visit, make appointment for patient before sending refill to provider.     Rx requested:  Requested Prescriptions     Pending Prescriptions Disp Refills    amLODIPine (NORVASC) 10 MG tablet 30 tablet 11     Sig: Take 1 tablet by mouth daily

## 2022-07-14 RX ORDER — AMLODIPINE BESYLATE 10 MG/1
10 TABLET ORAL DAILY
Qty: 30 TABLET | Refills: 11 | Status: SHIPPED | OUTPATIENT
Start: 2022-07-14 | End: 2022-09-27 | Stop reason: SDUPTHER

## 2022-07-14 NOTE — TELEPHONE ENCOUNTER
Patient returned call; patient states that while she was in the hospital, they took her off of the Lisinopril and Metformin due to her kidney's) then they increased the Lisinopril to 10mg.

## 2022-09-27 ENCOUNTER — HOSPITAL ENCOUNTER (OUTPATIENT)
Age: 77
Setting detail: SPECIMEN
Discharge: HOME | End: 2022-09-27
Payer: MEDICARE

## 2022-09-27 ENCOUNTER — OFFICE VISIT (OUTPATIENT)
Dept: FAMILY MEDICINE CLINIC | Age: 77
End: 2022-09-27
Payer: MEDICARE

## 2022-09-27 VITALS
HEART RATE: 80 BPM | DIASTOLIC BLOOD PRESSURE: 96 MMHG | HEIGHT: 62 IN | OXYGEN SATURATION: 96 % | TEMPERATURE: 97.6 F | BODY MASS INDEX: 31.65 KG/M2 | WEIGHT: 172 LBS | SYSTOLIC BLOOD PRESSURE: 148 MMHG

## 2022-09-27 DIAGNOSIS — E11.29 TYPE 2 DIABETES MELLITUS WITH MICROALBUMINURIA, WITHOUT LONG-TERM CURRENT USE OF INSULIN (HCC): ICD-10-CM

## 2022-09-27 DIAGNOSIS — R80.9 TYPE 2 DIABETES MELLITUS WITH MICROALBUMINURIA, WITHOUT LONG-TERM CURRENT USE OF INSULIN (HCC): Primary | ICD-10-CM

## 2022-09-27 DIAGNOSIS — R80.9 TYPE 2 DIABETES MELLITUS WITH MICROALBUMINURIA, WITHOUT LONG-TERM CURRENT USE OF INSULIN (HCC): ICD-10-CM

## 2022-09-27 DIAGNOSIS — E11.29 TYPE 2 DIABETES MELLITUS WITH MICROALBUMINURIA, WITHOUT LONG-TERM CURRENT USE OF INSULIN (HCC): Primary | ICD-10-CM

## 2022-09-27 DIAGNOSIS — I10 PRIMARY HYPERTENSION: ICD-10-CM

## 2022-09-27 DIAGNOSIS — Z23 ENCOUNTER FOR IMMUNIZATION: ICD-10-CM

## 2022-09-27 LAB — HBA1C MFR BLD: 8 %

## 2022-09-27 PROCEDURE — 99214 OFFICE O/P EST MOD 30 MIN: CPT | Performed by: FAMILY MEDICINE

## 2022-09-27 PROCEDURE — 80048 BASIC METABOLIC PNL TOTAL CA: CPT

## 2022-09-27 PROCEDURE — 3052F HG A1C>EQUAL 8.0%<EQUAL 9.0%: CPT | Performed by: FAMILY MEDICINE

## 2022-09-27 PROCEDURE — 83036 HEMOGLOBIN GLYCOSYLATED A1C: CPT | Performed by: FAMILY MEDICINE

## 2022-09-27 PROCEDURE — 90694 VACC AIIV4 NO PRSRV 0.5ML IM: CPT | Performed by: FAMILY MEDICINE

## 2022-09-27 PROCEDURE — G0008 ADMIN INFLUENZA VIRUS VAC: HCPCS | Performed by: FAMILY MEDICINE

## 2022-09-27 PROCEDURE — 36415 COLL VENOUS BLD VENIPUNCTURE: CPT | Performed by: FAMILY MEDICINE

## 2022-09-27 PROCEDURE — 1123F ACP DISCUSS/DSCN MKR DOCD: CPT | Performed by: FAMILY MEDICINE

## 2022-09-27 RX ORDER — AMLODIPINE BESYLATE 10 MG/1
10 TABLET ORAL DAILY
Qty: 90 TABLET | Refills: 3 | Status: SHIPPED | OUTPATIENT
Start: 2022-09-27

## 2022-09-27 RX ORDER — CARVEDILOL 12.5 MG/1
TABLET ORAL
COMMUNITY
Start: 2022-09-22

## 2022-09-27 RX ORDER — GLUCOSAMINE HCL/CHONDROITIN SU 500-400 MG
CAPSULE ORAL
Qty: 100 STRIP | Refills: 11 | Status: SHIPPED | OUTPATIENT
Start: 2022-09-27

## 2022-09-27 RX ORDER — AMLODIPINE BESYLATE 5 MG/1
TABLET ORAL
COMMUNITY
Start: 2022-07-14 | End: 2022-09-27

## 2022-09-27 ASSESSMENT — ENCOUNTER SYMPTOMS
SORE THROAT: 0
DIARRHEA: 0
RHINORRHEA: 0
SHORTNESS OF BREATH: 0
ABDOMINAL PAIN: 0
CONSTIPATION: 0
COUGH: 0
WHEEZING: 0

## 2022-09-27 NOTE — PROGRESS NOTES
[address] [facility] [address] [facility] PRIMARY CARE  [address] [place] [id number]  Dept: [phone]  Dept Fax: [phone]  Loc: [phone]     Chief Complaint  Chief Complaint   Patient presents with    Diabetes     Blood sugar high at home    Orders     Glucose meter       HPI:  64 y. o.female who presents for the following:      DM2: a1c 8.0 from 7.7; compliant with meds; compliant with diet; No excessive thirst, urination, or blurry vision. Getting higher sugars lately; the metformin stopped during her last hospitalizations; says she was told her kidneys were doing well; they also stopped the lisinopril and increased the norvasc    Current diabetes regimen:   - starting jardiance     Has continued shoulder pain and planning surgery soon    Review of Systems   Constitutional:  Negative for chills and fever. HENT:  Negative for congestion, rhinorrhea and sore throat. Respiratory:  Negative for cough, shortness of breath and wheezing. Gastrointestinal:  Negative for abdominal pain, constipation and diarrhea. Endocrine: Negative for polydipsia and polyuria. Genitourinary:  Negative for dysuria, frequency and urgency. Neurological:  Negative for syncope, light-headedness, numbness and headaches. Psychiatric/Behavioral:  Negative for sleep disturbance. The patient is not nervous/anxious.       Past Medical History:   Diagnosis Date    Acid reflux     Arthritis     Hyperlipidemia     Hypertension     Spinal stenosis     Type II or unspecified type diabetes mellitus without mention of complication, not stated as uncontrolled      Past Surgical History:   Procedure Laterality Date    CHOLECYSTECTOMY      COLON SURGERY  2010    bowel reduction    EYE SURGERY Right     laser surgery tore retina     KNEE SURGERY Right 09/2013    torn mennoahcus & baker cyst burst    LUMBAR LAMINECTOMY       Social History     Socioeconomic History    Marital status:      Spouse name: Not on file    Number of children: Not on file    Years of education: Not on file    Highest education level: Not on file   Occupational History    Not on file   Tobacco Use    Smoking status: Never    Smokeless tobacco: Never   Substance and Sexual Activity    Alcohol use: Yes     Comment: rare    Drug use: No    Sexual activity: Yes   Other Topics Concern    Not on file   Social History Narrative    Not on file     Social Determinants of Health     Financial Resource Strain: Low Risk     Difficulty of Paying Living Expenses: Not hard at all   Food Insecurity: No Food Insecurity    Worried About Running Out of Food in the Last Year: Never true    Ran Out of Food in the Last Year: Never true   Transportation Needs: Not on file   Physical Activity: Inactive    Days of Exercise per Week: 0 days    Minutes of Exercise per Session: 0 min   Stress: Not on file   Social Connections: Not on file   Intimate Partner Violence: Not on file   Housing Stability: Not on file     Family History   Problem Relation Age of Onset    Diabetes Mother     Heart Disease Mother     Stroke Mother     Breast Cancer Maternal Aunt       Allergies   Allergen Reactions    Augmentin [Amoxicillin-Pot Clavulanate] Nausea And Vomiting    Atorvastatin Other (See Comments)    Clonidine Other (See Comments)    Flagyl [Metronidazole]     Metronidazole     Sulfa Antibiotics      Current Outpatient Medications   Medication Sig Dispense Refill    carvedilol (COREG) 12.5 MG tablet TAKE 1 TABLET Twice daily      amLODIPine (NORVASC) 10 MG tablet Take 1 tablet by mouth daily 90 tablet 3    empagliflozin (JARDIANCE) 25 MG tablet Take 1 tablet by mouth daily 90 tablet 0    blood glucose monitor kit and supplies Use daily 1 kit 1    blood glucose monitor strips Use daily 100 strip 11    Accu-Chek FastClix Lancets MISC test THREE TIMES DAILY 102 each 3    oxyCODONE-acetaminophen (PERCOCET) 7.5-325 MG per tablet Take 1 tablet by mouth every 4 hours as needed for Pain. traZODone (DESYREL) 100 MG tablet TAKE 1 TABLET BY MOUTH NIGHTLY 30 tablet 11    fenofibrate (TRICOR) 145 MG tablet TAKE 1 TABLET DAILY      Lancet Device MISC 1 Device by Does not apply route 3 times daily Test 4x/day. 100 each 5    Blood Glucose Monitoring Suppl DONTRELL Use TID; also needs supplies for machine 1 Device 0    omeprazole (PRILOSEC) 20 MG capsule Take 20 mg by mouth as needed       aspirin 81 MG tablet Take 81 mg by mouth daily. pravastatin (PRAVACHOL) 40 MG tablet Take 40 mg by mouth daily. No current facility-administered medications for this visit. Vitals:    09/27/22 1300 09/27/22 1321   BP: (!) 146/98 (!) 148/96   Pulse: 80    Temp: 97.6 °F (36.4 °C)    TempSrc: Infrared    SpO2: 96%    Weight: 172 lb (78 kg)    Height: 5' 2\" (1.575 m)        Physical exam:  Physical Exam  Vitals reviewed. Constitutional:       General: She is not in acute distress. Appearance: She is well-developed. HENT:      Head: Normocephalic and atraumatic. Cardiovascular:      Rate and Rhythm: Normal rate. Pulmonary:      Effort: Pulmonary effort is normal. No respiratory distress. Musculoskeletal:      Cervical back: Normal range of motion. Skin:     General: Skin is warm and dry. Neurological:      Mental Status: She is alert and oriented to person, place, and time. Psychiatric:         Attention and Perception: Attention normal.         Behavior: Behavior normal.       Assessment/Plan:  64 y.o. female here mainly for DM2:  - DM2: slipping; will add SGLT2  - HTN: not at goal but I think the pain is affecting her    - she and  headed to [place] at the end of December and back in march. Diagnosis Orders   1.  Type 2 diabetes mellitus with microalbuminuria, without long-term current use of insulin ([facility])  POCT glycosylated hemoglobin (Hb A1C)    empagliflozin (JARDIANCE) 25 MG tablet    Basic Metabolic Panel    blood glucose monitor kit and supplies    blood glucose monitor strips      2. Primary hypertension  amLODIPine (NORVASC) 10 MG tablet      3. Encounter for immunization  Influenza, FLUAD, (age 72 y+), IM, Preservative Free, 0.5 mL           Return in about 6 months (around 3/27/2023) for DM2.     Guevara Chi MD

## 2022-09-27 NOTE — PROGRESS NOTES
Vaccine Information Sheet, \"Influenza - Inactivated\" OR \"Live - Intranasal\"  given to Hong Curtis, or parent/legal guardian of  Hong Curtis and verbalized understanding. Patient responses:    Have you ever had a reaction to a flu vaccine? No  Are you able to eat eggs without adverse effects? Yes  Do you have any current illness? No  Have you ever had Guillian Galien Syndrome? No    Flu vaccine given per order. Please see immunization tab.

## 2022-09-28 LAB
ANION GAP SERPL CALCULATED.3IONS-SCNC: 11 MEQ/L (ref 9–15)
BUN BLDV-MCNC: 31 MG/DL (ref 8–23)
CALCIUM SERPL-MCNC: 9.5 MG/DL (ref 8.5–9.9)
CHLORIDE BLD-SCNC: 99 MEQ/L (ref 95–107)
CO2: 26 MEQ/L (ref 20–31)
CREAT SERPL-MCNC: 1.43 MG/DL (ref 0.5–0.9)
GFR AFRICAN AMERICAN: 42.8
GFR NON-AFRICAN AMERICAN: 35.4
GLUCOSE BLD-MCNC: 217 MG/DL (ref 70–99)
POTASSIUM SERPL-SCNC: 4.9 MEQ/L (ref 3.4–4.9)
SODIUM BLD-SCNC: 136 MEQ/L (ref 135–144)

## 2022-09-29 DIAGNOSIS — R80.9 TYPE 2 DIABETES MELLITUS WITH MICROALBUMINURIA, WITHOUT LONG-TERM CURRENT USE OF INSULIN (HCC): ICD-10-CM

## 2022-09-29 DIAGNOSIS — E11.29 TYPE 2 DIABETES MELLITUS WITH MICROALBUMINURIA, WITHOUT LONG-TERM CURRENT USE OF INSULIN (HCC): ICD-10-CM

## 2022-11-02 ENCOUNTER — NURSE ONLY (OUTPATIENT)
Dept: FAMILY MEDICINE CLINIC | Age: 77
End: 2022-11-02
Payer: MEDICARE

## 2022-11-02 ENCOUNTER — HOSPITAL ENCOUNTER (OUTPATIENT)
Age: 77
Setting detail: SPECIMEN
Discharge: HOME | End: 2022-11-02
Payer: MEDICARE

## 2022-11-02 ENCOUNTER — TELEPHONE (OUTPATIENT)
Dept: FAMILY MEDICINE CLINIC | Age: 77
End: 2022-11-02

## 2022-11-02 VITALS — TEMPERATURE: 97.5 F

## 2022-11-02 DIAGNOSIS — E11.29 TYPE 2 DIABETES MELLITUS WITH MICROALBUMINURIA, WITHOUT LONG-TERM CURRENT USE OF INSULIN (HCC): ICD-10-CM

## 2022-11-02 DIAGNOSIS — E11.29 TYPE 2 DIABETES MELLITUS WITH MICROALBUMINURIA, WITHOUT LONG-TERM CURRENT USE OF INSULIN (HCC): Primary | ICD-10-CM

## 2022-11-02 DIAGNOSIS — R80.9 TYPE 2 DIABETES MELLITUS WITH MICROALBUMINURIA, WITHOUT LONG-TERM CURRENT USE OF INSULIN (HCC): ICD-10-CM

## 2022-11-02 DIAGNOSIS — R80.9 TYPE 2 DIABETES MELLITUS WITH MICROALBUMINURIA, WITHOUT LONG-TERM CURRENT USE OF INSULIN (HCC): Primary | ICD-10-CM

## 2022-11-02 LAB
ANION GAP SERPL CALCULATED.3IONS-SCNC: 13 MEQ/L (ref 9–15)
BUN BLDV-MCNC: 35 MG/DL (ref 8–23)
CALCIUM SERPL-MCNC: 9.3 MG/DL (ref 8.5–9.9)
CHLORIDE BLD-SCNC: 103 MEQ/L (ref 95–107)
CO2: 24 MEQ/L (ref 20–31)
CREAT SERPL-MCNC: 1.3 MG/DL (ref 0.5–0.9)
GFR SERPL CREATININE-BSD FRML MDRD: 41.8 ML/MIN/{1.73_M2}
GLUCOSE BLD-MCNC: 240 MG/DL (ref 70–99)
POTASSIUM SERPL-SCNC: 4.8 MEQ/L (ref 3.4–4.9)
SODIUM BLD-SCNC: 140 MEQ/L (ref 135–144)

## 2022-11-02 PROCEDURE — 80048 BASIC METABOLIC PNL TOTAL CA: CPT

## 2022-11-02 PROCEDURE — 36415 COLL VENOUS BLD VENIPUNCTURE: CPT | Performed by: FAMILY MEDICINE

## 2022-11-02 NOTE — TELEPHONE ENCOUNTER
All meds I provide have been considered in the context of your kidneys. Just need to check and see if your insurance covers.

## 2022-11-02 NOTE — TELEPHONE ENCOUNTER
Patient returned call and wants to know if either of these medications will be harmful to her kidneys. She also is concerned about the cost of each which I instructed to contact her insurance to see which one they would cover.

## 2022-11-02 NOTE — PROGRESS NOTES
Patient is here today to have her blood work done. Pt tolerated the procedure well and made aware we will contact her tomorrow with the results and recommendations.

## 2022-11-02 NOTE — TELEPHONE ENCOUNTER
The med is supposed to help the blood sugar and kidney function and heart. Can replace with a similar med like Rickey [place] or steglatro if you are willing.

## 2022-11-02 NOTE — TELEPHONE ENCOUNTER
Patient made aware of message.  Patient states that she will go ahead and refill the Jardiance because she already has a refill it.(FYI)

## 2022-11-02 NOTE — TELEPHONE ENCOUNTER
Patient was in the office today to have her lab work done and wanted to update you about her diabetes. She wanted to let you know the Jardiance you put her on is expensive and she is wondering if we have any samples to give to her due to the cost of the medication. We do not currently have any samples of Jardiance. She also took her last pill on 10/31/22. She also wanted to know how this medication will affect her A1c and kidney function. She said her glucose readings are higher then what she expected with this medication. It ranges from 143-160's. States it is in the low 150's most of the time.

## 2022-11-14 ENCOUNTER — TELEPHONE (OUTPATIENT)
Dept: FAMILY MEDICINE CLINIC | Age: 77
End: 2022-11-14

## 2022-11-14 NOTE — TELEPHONE ENCOUNTER
150's doesn't sound too bad compared to before. There are other meds we could add if interested but I know that anxieties about new meds and cost are an issue. Could consider trulicity to add on if you are interested.

## 2022-11-14 NOTE — TELEPHONE ENCOUNTER
Patient made aware of message. Patient states that when she was Metformin it was 120-130's. She is concerned that the Jardiance won't get her a1c down so that she can have the surgery. She is concerned because she has a bad shoulder and wants to make sure that her sugars are good so that her surgery could be rescheduled. Patient wants to know if [employer] will help bring her A1C down? States that the surgeon told her that complications could be bad if her A1C isn't down. She states that she was expecting big changes with Jardiance from what she's seen. She doesn't know if the block she had done could be causing elevated levels?

## 2022-11-14 NOTE — TELEPHONE ENCOUNTER
Daily blood sugars are averaging around 150 and Patient is wondering if the [employer] is working. Patient is discouraged and is upset that she had to cancel her surgery due to her A1C level.

## 2022-11-16 ENCOUNTER — TELEPHONE (OUTPATIENT)
Dept: FAMILY MEDICINE CLINIC | Age: 77
End: 2022-11-16

## 2022-11-18 ENCOUNTER — OFFICE VISIT (OUTPATIENT)
Dept: FAMILY MEDICINE CLINIC | Age: 77
End: 2022-11-18
Payer: MEDICARE

## 2022-11-18 VITALS
OXYGEN SATURATION: 97 % | TEMPERATURE: 97 F | WEIGHT: 170 LBS | HEART RATE: 83 BPM | DIASTOLIC BLOOD PRESSURE: 80 MMHG | SYSTOLIC BLOOD PRESSURE: 146 MMHG | BODY MASS INDEX: 31.28 KG/M2 | HEIGHT: 62 IN

## 2022-11-18 DIAGNOSIS — E11.29 TYPE 2 DIABETES MELLITUS WITH MICROALBUMINURIA, WITHOUT LONG-TERM CURRENT USE OF INSULIN (HCC): Primary | ICD-10-CM

## 2022-11-18 DIAGNOSIS — R80.9 TYPE 2 DIABETES MELLITUS WITH MICROALBUMINURIA, WITHOUT LONG-TERM CURRENT USE OF INSULIN (HCC): Primary | ICD-10-CM

## 2022-11-18 DIAGNOSIS — I10 PRIMARY HYPERTENSION: ICD-10-CM

## 2022-11-18 LAB
AVERAGE GLUCOSE: NORMAL
HBA1C MFR BLD: 6.9 %

## 2022-11-18 PROCEDURE — 3044F HG A1C LEVEL LT 7.0%: CPT | Performed by: FAMILY MEDICINE

## 2022-11-18 PROCEDURE — 99213 OFFICE O/P EST LOW 20 MIN: CPT | Performed by: FAMILY MEDICINE

## 2022-11-18 PROCEDURE — 1123F ACP DISCUSS/DSCN MKR DOCD: CPT | Performed by: FAMILY MEDICINE

## 2022-11-18 PROCEDURE — 3078F DIAST BP <80 MM HG: CPT | Performed by: FAMILY MEDICINE

## 2022-11-18 PROCEDURE — 3074F SYST BP LT 130 MM HG: CPT | Performed by: FAMILY MEDICINE

## 2022-11-18 RX ORDER — AMLODIPINE BESYLATE 10 MG/1
10 TABLET ORAL DAILY
Qty: 90 TABLET | Refills: 3 | Status: SHIPPED | OUTPATIENT
Start: 2022-11-18

## 2022-11-18 ASSESSMENT — ENCOUNTER SYMPTOMS
DIARRHEA: 0
SORE THROAT: 0
SHORTNESS OF BREATH: 0
ABDOMINAL PAIN: 0
RHINORRHEA: 0
CONSTIPATION: 0
WHEEZING: 0
COUGH: 0

## 2022-11-18 NOTE — PROGRESS NOTES
[address] PRIMARY CARE  [address] [address]  Dept: [phone]  Dept Fax: [phone]  Loc: [phone]     Chief Complaint  Chief Complaint   Patient presents with    Diabetes    Discuss Medications     Jardiance    Pruritis     All over, x2 weeks       HPI:  76 y. o.female who presents for the following:      Itching: arms and legs and truck intermittently; usually after supper; no rashes; tried different deodorants; she wonders if this is from the jardiance    DM2: a1c 6.9 from 8.0; compliant with meds; compliant with diet; No excessive thirst, urination, or blurry vision. Current diabetes regimen:   - Jardiance     Review of Systems   Constitutional:  Negative for chills and fever. HENT:  Negative for congestion, rhinorrhea and sore throat. Respiratory:  Negative for cough, shortness of breath and wheezing. Gastrointestinal:  Negative for abdominal pain, constipation and diarrhea. Endocrine: Negative for polydipsia and polyuria. Genitourinary:  Negative for dysuria, frequency and urgency. Neurological:  Negative for syncope, light-headedness, numbness and headaches. Psychiatric/Behavioral:  Negative for sleep disturbance. The patient is not nervous/anxious.       Past Medical History:   Diagnosis Date    Acid reflux     Arthritis     Hyperlipidemia     Hypertension     Spinal stenosis     Type II or unspecified type diabetes mellitus without mention of complication, not stated as uncontrolled      Past Surgical History:   Procedure Laterality Date    CHOLECYSTECTOMY      COLON SURGERY  2010    bowel reduction    EYE SURGERY Right     laser surgery tore retina     KNEE SURGERY Right 09/2013    torn menescus & baker cyst burst    LUMBAR LAMINECTOMY       Social History     Socioeconomic History    Marital status:      Spouse name: Not on file    Number of children: Not on file    Years of education: Not on file    Highest education level: Not on file   Occupational History    Not on file   Tobacco Use    Smoking status: Never    Smokeless tobacco: Never   Substance and Sexual Activity    Alcohol use: Yes     Comment: rare    Drug use: No    Sexual activity: Yes   Other Topics Concern    Not on file   Social History Narrative    Not on file     Social Determinants of Health     Financial Resource Strain: Low Risk     Difficulty of Paying Living Expenses: Not hard at all   Food Insecurity: No Food Insecurity    Worried About Running Out of Food in the Last Year: Never true    Ran Out of Food in the Last Year: Never true   Transportation Needs: Not on file   Physical Activity: Inactive    Days of Exercise per Week: 0 days    Minutes of Exercise per Session: 0 min   Stress: Not on file   Social Connections: Not on file   Intimate Partner Violence: Not on file   Housing Stability: Not on file     Family History   Problem Relation Age of Onset    Diabetes Mother     Heart Disease Mother     Stroke Mother     Breast Cancer Maternal Aunt       Allergies   Allergen Reactions    Augmentin [Amoxicillin-Pot Clavulanate] Nausea And Vomiting    Atorvastatin Other (See Comments)    Clonidine Other (See Comments)    Metronidazole     Sulfa Antibiotics      Current Outpatient Medications   Medication Sig Dispense Refill    Calcium Carb-Cholecalciferol (OYSTER SHELL CALCIUM) 500-400 MG-UNIT TABS Take 1 tablet by mouth daily      cyanocobalamin 100 MCG tablet Take 100 mcg by mouth daily      mupirocin (BACTROBAN) 2 % ointment twice daily for 5 days. Apply 0.5 inch with cotton swab (Q-tip) to each nostril in the morning and evening for 5 days prior to and including day of surgery.       amLODIPine (NORVASC) 10 MG tablet Take 1 tablet by mouth daily 90 tablet 3    empagliflozin (JARDIANCE) 25 MG tablet Take 1 tablet by mouth daily 90 tablet 3    carvedilol (COREG) 12.5 MG tablet TAKE 1 TABLET Twice daily      blood glucose monitor kit and supplies Use daily 1 kit 1    blood glucose monitor strips Use daily 100 strip 11    Accu-Chek FastClix Lancets MISC test THREE TIMES DAILY 102 each 3    oxyCODONE-acetaminophen (PERCOCET) 7.5-325 MG per tablet Take 1 tablet by mouth every 4 hours as needed for Pain. traZODone (DESYREL) 100 MG tablet TAKE 1 TABLET BY MOUTH NIGHTLY 30 tablet 11    fenofibrate (TRICOR) 145 MG tablet TAKE 1 TABLET DAILY      Lancet Device MISC 1 Device by Does not apply route 3 times daily Test 4x/day. 100 each 5    Blood Glucose Monitoring Suppl DONTRELL Use TID; also needs supplies for machine 1 Device 0    omeprazole (PRILOSEC) 20 MG capsule Take 20 mg by mouth as needed       aspirin 81 MG tablet Take 81 mg by mouth daily. pravastatin (PRAVACHOL) 40 MG tablet Take 40 mg by mouth daily. No current facility-administered medications for this visit. Vitals:    11/18/22 1548 11/18/22 1625   BP: (!) 146/80 (!) 146/80   Pulse: 83    Temp: 97 °F (36.1 °C)    TempSrc: Infrared    SpO2: 97%    Weight: 170 lb (77.1 kg)    Height: 5' 2\" (1.575 m)        Physical exam:  Physical Exam  Vitals reviewed. Constitutional:       General: She is not in acute distress. Appearance: She is well-developed. HENT:      Head: Normocephalic and atraumatic. Cardiovascular:      Rate and Rhythm: Normal rate. Pulmonary:      Effort: Pulmonary effort is normal. No respiratory distress. Musculoskeletal:      Cervical back: Normal range of motion. Skin:     General: Skin is warm and dry. Neurological:      Mental Status: She is alert and oriented to person, place, and time.    Psychiatric:         Attention and Perception: Attention normal.         Behavior: Behavior normal.       Assessment/Plan:  76 y.o. female here mainly for DM2:  - DM2: at goal; should be able to have surgery; I don't think the jardiance is causing itching; suggested stopping the tricor which she declined     Diagnosis Orders 1. Type 2 diabetes mellitus with microalbuminuria, without long-term current use of insulin ([facility])  empagliflozin (JARDIANCE) 25 MG tablet      2. Primary hypertension  amLODIPine (NORVASC) 10 MG tablet           Return in about 3 months (around 2/18/2023) for DM2.     Inderjit Malik MD

## 2022-11-23 NOTE — TELEPHONE ENCOUNTER
Revised note from 11/14/22:  Daily blood sugars are averaging around 150 and Patient is wondering if the [employer] is working. Patient is discouraged and is upset that she had to cancel her surgery due to her A1C level. Patient would like to know what else can be done.

## 2022-12-15 ENCOUNTER — TELEPHONE (OUTPATIENT)
Dept: FAMILY MEDICINE CLINIC | Age: 77
End: 2022-12-15

## 2022-12-15 NOTE — TELEPHONE ENCOUNTER
The kidneys aren't healthy enough for metformin. Your numbers were much improved last month when we checked. I have alternatives to jardiance that work through a similar mechanism such as Guerra Grooms, and invokana. Can we change to one of them.

## 2022-12-15 NOTE — TELEPHONE ENCOUNTER
Patient is having a lot of itching (all over) ever since starting Jardiance; starts mostly at night. Patient has tried OTC medication to try and help but nothing works.; it's \"driving me crazy\". Patient says that she is not seeing and improvement in her daily numbers and is requesting to stop the Jardiance and go back to Metformin.

## 2022-12-15 NOTE — TELEPHONE ENCOUNTER
Pt states she doesn't know anything about any of those medications and wants to know what you recommend

## 2022-12-19 NOTE — TELEPHONE ENCOUNTER
Patient is going to check with a family member who is a pharmacist and see what they can tell her about these medications and then the patient will call back.

## 2022-12-24 NOTE — TELEPHONE ENCOUNTER
Patient reporting that she is itching all over, started a while ago but she has been trying to take care of it on her own. She believes it is the Jardiance causing it as it is one of the side effects. Patient wondering what she should doi. Yes

## 2023-02-08 ENCOUNTER — OFFICE VISIT (OUTPATIENT)
Dept: INTERNAL MEDICINE | Age: 78
End: 2023-02-08
Payer: MEDICARE

## 2023-02-08 VITALS
BODY MASS INDEX: 30.88 KG/M2 | WEIGHT: 167.8 LBS | OXYGEN SATURATION: 95 % | HEART RATE: 92 BPM | HEIGHT: 62 IN | SYSTOLIC BLOOD PRESSURE: 134 MMHG | DIASTOLIC BLOOD PRESSURE: 76 MMHG

## 2023-02-08 DIAGNOSIS — R80.9 TYPE 2 DIABETES MELLITUS WITH MICROALBUMINURIA, WITHOUT LONG-TERM CURRENT USE OF INSULIN (HCC): ICD-10-CM

## 2023-02-08 DIAGNOSIS — E11.29 TYPE 2 DIABETES MELLITUS WITH MICROALBUMINURIA, WITHOUT LONG-TERM CURRENT USE OF INSULIN (HCC): ICD-10-CM

## 2023-02-08 DIAGNOSIS — H69.83 DYSFUNCTION OF BOTH EUSTACHIAN TUBES: Primary | ICD-10-CM

## 2023-02-08 PROCEDURE — 3078F DIAST BP <80 MM HG: CPT | Performed by: FAMILY MEDICINE

## 2023-02-08 PROCEDURE — 99213 OFFICE O/P EST LOW 20 MIN: CPT | Performed by: FAMILY MEDICINE

## 2023-02-08 PROCEDURE — 1123F ACP DISCUSS/DSCN MKR DOCD: CPT | Performed by: FAMILY MEDICINE

## 2023-02-08 PROCEDURE — 3075F SYST BP GE 130 - 139MM HG: CPT | Performed by: FAMILY MEDICINE

## 2023-02-08 RX ORDER — FLUTICASONE PROPIONATE 50 MCG
2 SPRAY, SUSPENSION (ML) NASAL DAILY
Qty: 16 G | Refills: 1 | Status: SHIPPED | OUTPATIENT
Start: 2023-02-08

## 2023-02-08 SDOH — ECONOMIC STABILITY: HOUSING INSECURITY
IN THE LAST 12 MONTHS, WAS THERE A TIME WHEN YOU DID NOT HAVE A STEADY PLACE TO SLEEP OR SLEPT IN A SHELTER (INCLUDING NOW)?: NO

## 2023-02-08 SDOH — ECONOMIC STABILITY: FOOD INSECURITY: WITHIN THE PAST 12 MONTHS, THE FOOD YOU BOUGHT JUST DIDN'T LAST AND YOU DIDN'T HAVE MONEY TO GET MORE.: NEVER TRUE

## 2023-02-08 SDOH — ECONOMIC STABILITY: FOOD INSECURITY: WITHIN THE PAST 12 MONTHS, YOU WORRIED THAT YOUR FOOD WOULD RUN OUT BEFORE YOU GOT MONEY TO BUY MORE.: NEVER TRUE

## 2023-02-08 SDOH — ECONOMIC STABILITY: INCOME INSECURITY: HOW HARD IS IT FOR YOU TO PAY FOR THE VERY BASICS LIKE FOOD, HOUSING, MEDICAL CARE, AND HEATING?: NOT HARD AT ALL

## 2023-02-08 ASSESSMENT — ENCOUNTER SYMPTOMS
COUGH: 0
ABDOMINAL PAIN: 0
RHINORRHEA: 0
SORE THROAT: 0
DIARRHEA: 0
SHORTNESS OF BREATH: 0
CONSTIPATION: 0
WHEEZING: 0

## 2023-02-08 ASSESSMENT — PATIENT HEALTH QUESTIONNAIRE - PHQ9
SUM OF ALL RESPONSES TO PHQ QUESTIONS 1-9: 0
2. FEELING DOWN, DEPRESSED OR HOPELESS: 0
SUM OF ALL RESPONSES TO PHQ9 QUESTIONS 1 & 2: 0
SUM OF ALL RESPONSES TO PHQ QUESTIONS 1-9: 0
1. LITTLE INTEREST OR PLEASURE IN DOING THINGS: 0

## 2023-02-08 NOTE — PROGRESS NOTES
[address]  PRIMARY CARE  [address]  [address] [address]  Dept: [phone]  Dept Fax: [phone]: [phone]     Chief Complaint  Chief Complaint   Patient presents with    Head Congestion     Unable to relieve the facial congestion. Nasal itchiness. Ear Problem     Went to [facility] and her ears were feeling closed up. Other     Seeing Endocrine ([facility]) for her Diabetes. HPI:  76 y. o.female who presents for the following: HEENT problem: started 2 weeks ago; feeling ear feel clogged and under pressure; nasal congestion; just got back from [facility] and headed to [place] next week; no fevers    DM2: she is seeing endocrine at [facility] for the DM2 now; last a1c 6.9 but after her trip to [place] it became 9.1    Review of Systems   Constitutional:  Negative for chills and fever. HENT:  Positive for congestion and ear pain. Negative for rhinorrhea and sore throat. Respiratory:  Negative for cough, shortness of breath and wheezing. Gastrointestinal:  Negative for abdominal pain, constipation and diarrhea. Endocrine: Negative for polydipsia and polyuria. Genitourinary:  Negative for dysuria, frequency and urgency. Neurological:  Negative for syncope, light-headedness, numbness and headaches. Psychiatric/Behavioral:  Negative for sleep disturbance. The patient is not nervous/anxious.       Past Medical History:   Diagnosis Date    Acid reflux     Arthritis     Hyperlipidemia     Hypertension     Spinal stenosis     Type II or unspecified type diabetes mellitus without mention of complication, not stated as uncontrolled      Past Surgical History:   Procedure Laterality Date    CHOLECYSTECTOMY      COLON SURGERY  2010    bowel reduction    EYE SURGERY Right     laser surgery tore retina     KNEE SURGERY Right 09/2013    torn carlyle & baker cyst burst    LUMBAR LAMINECTOMY Social History     Socioeconomic History    Marital status:      Spouse name: Not on file    Number of children: Not on file    Years of education: Not on file    Highest education level: Not on file   Occupational History    Not on file   Tobacco Use    Smoking status: Never    Smokeless tobacco: Never   Substance and Sexual Activity    Alcohol use: Yes     Comment: rare    Drug use: No    Sexual activity: Yes   Other Topics Concern    Not on file   Social History Narrative    Not on file     Social Determinants of Health     Financial Resource Strain: Low Risk     Difficulty of Paying Living Expenses: Not hard at all   Food Insecurity: No Food Insecurity    Worried About [employer] in the Last Year: Never true    [employer] in the Last Year: Never true   Transportation Needs: Unknown    Lack of Transportation (Medical): Not on file    Lack of Transportation (Non-Medical):  No   Physical Activity: Inactive    Days of Exercise per Week: 0 days    Minutes of Exercise per Session: 0 min   Stress: Not on file   Social Connections: Not on file   Intimate Partner Violence: Not on file   Housing Stability: Unknown    Unable to Pay for Housing in the Last Year: Not on file    Number of [place] in the Last Year: Not on file    Unstable Housing in the Last Year: No     Family History   Problem Relation Age of Onset    Diabetes Mother     Heart Disease Mother     Stroke Mother     Breast Cancer Maternal Aunt       Allergies   Allergen Reactions    Augmentin [Amoxicillin-Pot Clavulanate] Nausea And Vomiting    Atorvastatin Other (See Comments)    Clonidine Other (See Comments)    Metronidazole     Sulfa Antibiotics      Current Outpatient Medications   Medication Sig Dispense Refill    fluticasone (FLONASE) 50 MCG/ACT nasal spray 2 sprays by Each Nostril route daily 16 g 1    Calcium Carb-Cholecalciferol (OYSTER SHELL CALCIUM) 500-400 MG-UNIT TABS Take 1 tablet by mouth daily      cyanocobalamin 100 MCG tablet Take 100 mcg by mouth daily      mupirocin (BACTROBAN) 2 % ointment twice daily for 5 days. Apply 0.5 inch with cotton swab (Q-tip) to each nostril in the morning and evening for 5 days prior to and including day of surgery. amLODIPine (NORVASC) 10 MG tablet Take 1 tablet by mouth daily 90 tablet 3    empagliflozin (JARDIANCE) 25 MG tablet Take 1 tablet by mouth daily 90 tablet 3    carvedilol (COREG) 12.5 MG tablet TAKE 1 TABLET Twice daily      blood glucose monitor kit and supplies Use daily 1 kit 1    blood glucose monitor strips Use daily 100 strip 11    Accu-Chek FastClix Lancets MISC test THREE TIMES DAILY 102 each 3    oxyCODONE-acetaminophen (PERCOCET) 7.5-325 MG per tablet Take 1 tablet by mouth every 4 hours as needed for Pain. traZODone (DESYREL) 100 MG tablet TAKE 1 TABLET BY MOUTH NIGHTLY 30 tablet 11    fenofibrate (TRICOR) 145 MG tablet TAKE 1 TABLET DAILY      Lancet Device MISC 1 Device by Does not apply route 3 times daily Test 4x/day. 100 each 5    Blood Glucose Monitoring Suppl DONTRELL Use TID; also needs supplies for machine 1 Device 0    omeprazole (PRILOSEC) 20 MG capsule Take 20 mg by mouth as needed       aspirin 81 MG tablet Take 81 mg by mouth daily. pravastatin (PRAVACHOL) 40 MG tablet Take 40 mg by mouth daily. No current facility-administered medications for this visit. Vitals:    02/08/23 1455   BP: 134/76   Site: Right Upper Arm   Position: Sitting   Cuff Size: Medium Adult   Pulse: 92   SpO2: 95%   Weight: 167 lb 12.8 oz (76.1 kg)   Height: 5' 2\" (1.575 m)       Physical exam:  Physical Exam  Vitals reviewed. Constitutional:       General: She is not in acute distress. Appearance: She is well-developed. HENT:      Head: Normocephalic and atraumatic. Right Ear: Tympanic membrane, ear canal and external ear normal. Tympanic membrane is not erythematous. Tympanic membrane has normal mobility.       Left Ear: Tympanic membrane, ear canal and external ear normal. Tympanic membrane is not erythematous. Tympanic membrane has normal mobility. Nose: Nose normal.      Mouth/Throat:      Pharynx: No oropharyngeal exudate. Neck:      Thyroid: No thyromegaly. Cardiovascular:      Rate and Rhythm: Normal rate and regular rhythm. Heart sounds: Normal heart sounds. No murmur heard. Pulmonary:      Effort: Pulmonary effort is normal. No respiratory distress. Breath sounds: Normal breath sounds. No wheezing. Abdominal:      General: Bowel sounds are normal. There is no distension. Palpations: Abdomen is soft. Tenderness: There is no abdominal tenderness. There is no guarding or rebound. Musculoskeletal:      Cervical back: Normal range of motion. Lymphadenopathy:      Cervical: No cervical adenopathy. Skin:     General: Skin is warm and dry. Neurological:      Mental Status: She is alert and oriented to person, place, and time. Psychiatric:         Behavior: Behavior normal.       Assessment/Plan:  76 y.o. female here mainly for ear problem:  - Ear problem: benign exam; likely eustachian tube dysfunction; trial of 1mo of nasal steroid; she already uses a decongestant; if no relief then will send to ENT       Diagnosis Orders   1. Dysfunction of both eustachian tubes  fluticasone (FLONASE) 50 MCG/ACT nasal spray      2. Type 2 diabetes mellitus with microalbuminuria, without long-term current use of insulin (HCC)             Return if symptoms worsen or fail to improve.     Javi Bartholomew MD

## 2023-03-10 ENCOUNTER — TELEPHONE (OUTPATIENT)
Dept: FAMILY MEDICINE CLINIC | Age: 78
End: 2023-03-10

## 2023-03-10 DIAGNOSIS — E11.29 TYPE 2 DIABETES MELLITUS WITH MICROALBUMINURIA, WITHOUT LONG-TERM CURRENT USE OF INSULIN (HCC): ICD-10-CM

## 2023-03-10 DIAGNOSIS — R80.9 TYPE 2 DIABETES MELLITUS WITH MICROALBUMINURIA, WITHOUT LONG-TERM CURRENT USE OF INSULIN (HCC): ICD-10-CM

## 2023-03-10 RX ORDER — LANCETS
EACH MISCELLANEOUS
Qty: 102 EACH | Refills: 3 | Status: SHIPPED | OUTPATIENT
Start: 2023-03-10

## 2023-05-24 ENCOUNTER — OFFICE VISIT (OUTPATIENT)
Dept: INTERNAL MEDICINE | Age: 78
End: 2023-05-24
Payer: MEDICARE

## 2023-05-24 VITALS
WEIGHT: 157 LBS | DIASTOLIC BLOOD PRESSURE: 70 MMHG | OXYGEN SATURATION: 95 % | SYSTOLIC BLOOD PRESSURE: 132 MMHG | HEART RATE: 93 BPM | HEIGHT: 62 IN | BODY MASS INDEX: 28.89 KG/M2

## 2023-05-24 DIAGNOSIS — E11.29 TYPE 2 DIABETES MELLITUS WITH MICROALBUMINURIA, WITHOUT LONG-TERM CURRENT USE OF INSULIN (HCC): Primary | ICD-10-CM

## 2023-05-24 DIAGNOSIS — R80.9 TYPE 2 DIABETES MELLITUS WITH MICROALBUMINURIA, WITHOUT LONG-TERM CURRENT USE OF INSULIN (HCC): Primary | ICD-10-CM

## 2023-05-24 LAB — HBA1C MFR BLD: 6.5 %

## 2023-05-24 PROCEDURE — 3044F HG A1C LEVEL LT 7.0%: CPT | Performed by: FAMILY MEDICINE

## 2023-05-24 PROCEDURE — 1123F ACP DISCUSS/DSCN MKR DOCD: CPT | Performed by: FAMILY MEDICINE

## 2023-05-24 PROCEDURE — 3078F DIAST BP <80 MM HG: CPT | Performed by: FAMILY MEDICINE

## 2023-05-24 PROCEDURE — 99213 OFFICE O/P EST LOW 20 MIN: CPT | Performed by: FAMILY MEDICINE

## 2023-05-24 PROCEDURE — 3075F SYST BP GE 130 - 139MM HG: CPT | Performed by: FAMILY MEDICINE

## 2023-05-24 RX ORDER — UBIDECARENONE 100 MG
CAPSULE ORAL
COMMUNITY

## 2023-05-24 RX ORDER — TRAMADOL HYDROCHLORIDE 50 MG/1
TABLET ORAL
COMMUNITY
Start: 2023-04-28

## 2023-05-24 RX ORDER — LISINOPRIL 10 MG/1
10 TABLET ORAL DAILY
COMMUNITY
Start: 2023-05-15

## 2023-05-24 RX ORDER — OXYCODONE HYDROCHLORIDE AND ACETAMINOPHEN 5; 325 MG/1; MG/1
TABLET ORAL
COMMUNITY
Start: 2023-05-11

## 2023-05-24 RX ORDER — SEMAGLUTIDE 1.34 MG/ML
INJECTION, SOLUTION SUBCUTANEOUS
COMMUNITY
Start: 2023-03-31

## 2023-05-24 ASSESSMENT — ENCOUNTER SYMPTOMS
SORE THROAT: 0
WHEEZING: 0
RHINORRHEA: 0
SHORTNESS OF BREATH: 0
DIARRHEA: 0
ABDOMINAL PAIN: 0
CONSTIPATION: 0
COUGH: 0

## 2023-05-24 NOTE — PROGRESS NOTES
[address]  PRIMARY CARE  [address]  [address] [place] [address]  Dept: [phone]  Dept Fax: [phone]: [phone]     Chief Complaint  Chief Complaint   Patient presents with    Diabetes       HPI:  76 y. o.female who presents for the following:      DM2: A1c 6.5 from 9.1; started seeing Endocrine at [facility] recently and started on ozempic and jardiance; also seeing nephrology for CKD; had R hip replacement 2 weeks ago; she is concerned because the jardiance is expensive but told by nephrology to stay on the jardiance; told by insurance that she is in donut hole    Review of Systems   Constitutional:  Negative for chills and fever. HENT:  Negative for congestion, rhinorrhea and sore throat. Respiratory:  Negative for cough, shortness of breath and wheezing. Gastrointestinal:  Negative for abdominal pain, constipation and diarrhea. Endocrine: Negative for polydipsia and polyuria. Genitourinary:  Negative for dysuria, frequency and urgency. Neurological:  Negative for syncope, light-headedness, numbness and headaches. Psychiatric/Behavioral:  Negative for sleep disturbance. The patient is not nervous/anxious.       Past Medical History:   Diagnosis Date    Acid reflux     Arthritis     Hyperlipidemia     Hypertension     Spinal stenosis     Type II or unspecified type diabetes mellitus without mention of complication, not stated as uncontrolled      Past Surgical History:   Procedure Laterality Date    CHOLECYSTECTOMY      COLON SURGERY  2010    bowel reduction    EYE SURGERY Right     laser surgery tore retina     KNEE SURGERY Right 09/2013    torn tyresecus & baker cyst burst    LUMBAR LAMINECTOMY       Social History     Socioeconomic History    Marital status:      Spouse name: Not on file    Number of children: Not on file    Years of education: Not on file    Highest education level: Not on file

## 2023-05-31 DIAGNOSIS — F51.01 PRIMARY INSOMNIA: ICD-10-CM

## 2023-05-31 RX ORDER — TRAZODONE HYDROCHLORIDE 100 MG/1
100 TABLET ORAL NIGHTLY
Qty: 30 TABLET | Refills: 11 | OUTPATIENT
Start: 2023-05-31

## 2023-05-31 RX ORDER — TRAZODONE HYDROCHLORIDE 100 MG/1
TABLET ORAL
Qty: 30 TABLET | Refills: 11 | Status: SHIPPED | OUTPATIENT
Start: 2023-05-31

## 2023-05-31 NOTE — TELEPHONE ENCOUNTER
Comments:     Last Office Visit (last PCP visit):   5/24/2023    Next Visit Date:  No future appointments. **If hasn't been seen in over a year OR hasn't followed up according to last diabetes/ADHD visit, make appointment for patient before sending refill to provider.     Rx requested:  Requested Prescriptions     Pending Prescriptions Disp Refills    traZODone (DESYREL) 100 MG tablet [Pharmacy Med Name: trazodone 100 mg tablet] 30 tablet 11     Sig: TAKE 1 TABLET BY MOUTH NIGHTLY

## 2023-06-19 DIAGNOSIS — N30.01 ACUTE CYSTITIS WITH HEMATURIA: ICD-10-CM

## 2023-06-20 LAB
BACTERIA UR CULT: ABNORMAL
BACTERIA UR CULT: ABNORMAL
ORGANISM: ABNORMAL

## 2023-06-21 ENCOUNTER — TELEPHONE (OUTPATIENT)
Dept: FAMILY MEDICINE CLINIC | Age: 78
End: 2023-06-21

## 2023-06-21 NOTE — TELEPHONE ENCOUNTER
Patient went to walk in clinic on Sunday for a UTI and was given an antibiotic. Patient states that she is feeling a little better but her  is telling her that she should feel better than she is. Patient  wonders what the urine culture results were and if she is on the right antibiotic.

## 2023-06-22 NOTE — RESULT ENCOUNTER NOTE
Urine positive for e.coli. Treated at time of visit. No further orders, follow-up with PCP one week after completion of antibiotics to ensure resolution.

## 2023-09-11 ENCOUNTER — OFFICE VISIT (OUTPATIENT)
Dept: FAMILY MEDICINE CLINIC | Age: 78
End: 2023-09-11
Payer: MEDICARE

## 2023-09-11 VITALS
HEIGHT: 62 IN | TEMPERATURE: 98.1 F | SYSTOLIC BLOOD PRESSURE: 112 MMHG | DIASTOLIC BLOOD PRESSURE: 66 MMHG | OXYGEN SATURATION: 98 % | BODY MASS INDEX: 28.35 KG/M2 | HEART RATE: 76 BPM

## 2023-09-11 DIAGNOSIS — K62.89 ANAL IRRITATION: ICD-10-CM

## 2023-09-11 DIAGNOSIS — K62.89 ANAL PAIN: Primary | ICD-10-CM

## 2023-09-11 PROCEDURE — 99213 OFFICE O/P EST LOW 20 MIN: CPT | Performed by: FAMILY MEDICINE

## 2023-09-11 PROCEDURE — 1123F ACP DISCUSS/DSCN MKR DOCD: CPT | Performed by: FAMILY MEDICINE

## 2023-09-11 PROCEDURE — 3078F DIAST BP <80 MM HG: CPT | Performed by: FAMILY MEDICINE

## 2023-09-11 PROCEDURE — 3074F SYST BP LT 130 MM HG: CPT | Performed by: FAMILY MEDICINE

## 2023-09-11 RX ORDER — LIDOCAINE 50 MG/G
OINTMENT TOPICAL
Qty: 50 G | Refills: 1 | Status: SHIPPED | OUTPATIENT
Start: 2023-09-11

## 2023-09-11 RX ORDER — FLUCONAZOLE 150 MG/1
150 TABLET ORAL ONCE
Qty: 1 TABLET | Refills: 0 | Status: SHIPPED | OUTPATIENT
Start: 2023-09-11 | End: 2023-09-11

## 2023-09-11 RX ORDER — PYRIDOXINE HCL (VITAMIN B6) 100 MG
100 TABLET ORAL DAILY
COMMUNITY

## 2023-09-11 RX ORDER — ACETAMINOPHEN 500 MG
1000 TABLET ORAL EVERY 8 HOURS PRN
COMMUNITY
Start: 2023-08-29 | End: 2023-09-12

## 2023-09-11 RX ORDER — MELOXICAM 15 MG/1
15 TABLET ORAL DAILY
Qty: 14 TABLET | Refills: 0 | COMMUNITY
Start: 2023-08-29 | End: 2023-09-12

## 2023-09-11 ASSESSMENT — ENCOUNTER SYMPTOMS
SORE THROAT: 0
BLOOD IN STOOL: 1
SHORTNESS OF BREATH: 0
ABDOMINAL PAIN: 0
WHEEZING: 0
DIARRHEA: 1
CONSTIPATION: 0
ANAL BLEEDING: 1
RHINORRHEA: 0
COUGH: 0

## 2023-09-18 LAB
CHOLESTEROL (MG/DL) IN SER/PLAS: 180 MG/DL (ref 0–199)
CHOLESTEROL IN HDL (MG/DL) IN SER/PLAS: 55.4 MG/DL
CHOLESTEROL/HDL RATIO: 3.2
LDL: 87 MG/DL (ref 0–99)
TRIGLYCERIDE (MG/DL) IN SER/PLAS: 187 MG/DL (ref 0–149)
VLDL: 37 MG/DL (ref 0–40)

## 2023-11-22 DIAGNOSIS — I10 PRIMARY HYPERTENSION: ICD-10-CM

## 2023-11-22 RX ORDER — AMLODIPINE BESYLATE 10 MG/1
10 TABLET ORAL DAILY
Qty: 90 TABLET | Refills: 2 | Status: SHIPPED | OUTPATIENT
Start: 2023-11-22

## 2023-11-22 NOTE — TELEPHONE ENCOUNTER
Comments:     Last Office Visit (last PCP visit):   9/11/2023    Next Visit Date:  Future Appointments   Date Time Provider Department Center   11/27/2023  9:45 AM Jackson Rabago MD [facility]   12/6/2023 10:30 AM Jackson Rabago MD [facility]       **If hasn't been seen in over a year OR hasn't followed up according to last diabetes/ADHD visit, make appointment for patient before sending refill to provider.    Rx requested:  Requested Prescriptions     Pending Prescriptions Disp Refills    amLODIPine (NORVASC) 10 MG tablet [Pharmacy Med Name: amlodipine 10 mg tablet] 90 tablet 3     Sig: Take 1 tablet by mouth daily

## 2023-11-25 DIAGNOSIS — E11.29 TYPE 2 DIABETES MELLITUS WITH MICROALBUMINURIA, WITHOUT LONG-TERM CURRENT USE OF INSULIN (HCC): ICD-10-CM

## 2023-11-25 DIAGNOSIS — R80.9 TYPE 2 DIABETES MELLITUS WITH MICROALBUMINURIA, WITHOUT LONG-TERM CURRENT USE OF INSULIN (HCC): ICD-10-CM

## 2023-11-27 RX ORDER — EMPAGLIFLOZIN 25 MG/1
25 TABLET, FILM COATED ORAL DAILY
Qty: 90 TABLET | Refills: 3 | OUTPATIENT
Start: 2023-11-27

## 2023-12-06 ENCOUNTER — OFFICE VISIT (OUTPATIENT)
Dept: INTERNAL MEDICINE | Age: 78
End: 2023-12-06
Payer: MEDICARE

## 2023-12-06 VITALS
DIASTOLIC BLOOD PRESSURE: 74 MMHG | OXYGEN SATURATION: 95 % | BODY MASS INDEX: 29.4 KG/M2 | WEIGHT: 159.8 LBS | SYSTOLIC BLOOD PRESSURE: 110 MMHG | HEART RATE: 74 BPM | HEIGHT: 62 IN

## 2023-12-06 DIAGNOSIS — Z00.00 MEDICARE ANNUAL WELLNESS VISIT, SUBSEQUENT: ICD-10-CM

## 2023-12-06 DIAGNOSIS — Z00.00 MEDICARE ANNUAL WELLNESS VISIT, SUBSEQUENT: Primary | ICD-10-CM

## 2023-12-06 DIAGNOSIS — Z12.31 ENCOUNTER FOR SCREENING MAMMOGRAM FOR MALIGNANT NEOPLASM OF BREAST: ICD-10-CM

## 2023-12-06 PROBLEM — J02.9 ST (SORE THROAT): Status: RESOLVED | Noted: 2021-06-19 | Resolved: 2023-12-06

## 2023-12-06 PROBLEM — N17.9 AKI (ACUTE KIDNEY INJURY) (HCC): Status: RESOLVED | Noted: 2022-03-11 | Resolved: 2023-12-06

## 2023-12-06 LAB
ALBUMIN SERPL-MCNC: 4.2 G/DL (ref 3.5–4.6)
ALP SERPL-CCNC: 51 U/L (ref 40–130)
ALT SERPL-CCNC: 15 U/L (ref 0–33)
ANION GAP SERPL CALCULATED.3IONS-SCNC: 9 MEQ/L (ref 9–15)
AST SERPL-CCNC: 25 U/L (ref 0–35)
BILIRUB SERPL-MCNC: 0.4 MG/DL (ref 0.2–0.7)
BUN SERPL-MCNC: 29 MG/DL (ref 8–23)
CALCIUM SERPL-MCNC: 9.3 MG/DL (ref 8.5–9.9)
CHLORIDE SERPL-SCNC: 101 MEQ/L (ref 95–107)
CO2 SERPL-SCNC: 26 MEQ/L (ref 20–31)
CREAT SERPL-MCNC: 1.25 MG/DL (ref 0.5–0.9)
GLOBULIN SER CALC-MCNC: 2.8 G/DL (ref 2.3–3.5)
GLUCOSE FASTING: 124 MG/DL (ref 70–99)
POTASSIUM SERPL-SCNC: 4.4 MEQ/L (ref 3.4–4.9)
PROT SERPL-MCNC: 7 G/DL (ref 6.3–8)
SODIUM SERPL-SCNC: 136 MEQ/L (ref 135–144)

## 2023-12-06 PROCEDURE — G0439 PPPS, SUBSEQ VISIT: HCPCS | Performed by: FAMILY MEDICINE

## 2023-12-06 PROCEDURE — 3078F DIAST BP <80 MM HG: CPT | Performed by: FAMILY MEDICINE

## 2023-12-06 PROCEDURE — 1123F ACP DISCUSS/DSCN MKR DOCD: CPT | Performed by: FAMILY MEDICINE

## 2023-12-06 PROCEDURE — 3074F SYST BP LT 130 MM HG: CPT | Performed by: FAMILY MEDICINE

## 2023-12-06 ASSESSMENT — PATIENT HEALTH QUESTIONNAIRE - PHQ9
SUM OF ALL RESPONSES TO PHQ QUESTIONS 1-9: 0
2. FEELING DOWN, DEPRESSED OR HOPELESS: 0
SUM OF ALL RESPONSES TO PHQ QUESTIONS 1-9: 0
SUM OF ALL RESPONSES TO PHQ9 QUESTIONS 1 & 2: 0
1. LITTLE INTEREST OR PLEASURE IN DOING THINGS: 0

## 2023-12-06 ASSESSMENT — LIFESTYLE VARIABLES
HOW MANY STANDARD DRINKS CONTAINING ALCOHOL DO YOU HAVE ON A TYPICAL DAY: 1 OR 2
HOW OFTEN DO YOU HAVE A DRINK CONTAINING ALCOHOL: MONTHLY OR LESS

## 2023-12-08 DIAGNOSIS — R80.9 TYPE 2 DIABETES MELLITUS WITH MICROALBUMINURIA, WITHOUT LONG-TERM CURRENT USE OF INSULIN (HCC): ICD-10-CM

## 2023-12-08 DIAGNOSIS — E11.29 TYPE 2 DIABETES MELLITUS WITH MICROALBUMINURIA, WITHOUT LONG-TERM CURRENT USE OF INSULIN (HCC): ICD-10-CM

## 2023-12-12 RX ORDER — EMPAGLIFLOZIN 25 MG/1
25 TABLET, FILM COATED ORAL DAILY
Qty: 90 TABLET | Refills: 3 | OUTPATIENT
Start: 2023-12-12

## 2024-05-22 PROBLEM — E66.3 OVERWEIGHT WITH BODY MASS INDEX (BMI) OF 28 TO 28.9 IN ADULT: Status: ACTIVE | Noted: 2024-05-22

## 2024-05-22 PROBLEM — U07.1 COVID-19: Status: ACTIVE | Noted: 2024-05-22

## 2024-05-22 PROBLEM — E11.9 TYPE 2 DIABETES MELLITUS (MULTI): Status: ACTIVE | Noted: 2024-05-22

## 2024-05-22 PROBLEM — Z78.9 NEVER SMOKED ANY SUBSTANCE: Status: ACTIVE | Noted: 2024-05-22

## 2024-05-22 PROBLEM — R00.2 PALPITATIONS: Status: ACTIVE | Noted: 2024-05-22

## 2024-05-22 PROBLEM — E66.9 OBESE: Status: ACTIVE | Noted: 2024-05-22

## 2024-05-22 PROBLEM — R07.89 CHEST DISCOMFORT: Status: ACTIVE | Noted: 2024-05-22

## 2024-05-22 PROBLEM — E66.9 CLASS 1 OBESITY WITH BODY MASS INDEX (BMI) OF 30.0 TO 30.9 IN ADULT: Status: RESOLVED | Noted: 2024-05-22 | Resolved: 2024-05-22

## 2024-05-22 PROBLEM — R07.9 CHEST PAIN: Status: ACTIVE | Noted: 2024-05-22

## 2024-05-22 PROBLEM — I10 BENIGN ESSENTIAL HYPERTENSION: Status: ACTIVE | Noted: 2024-05-22

## 2024-05-22 PROBLEM — E66.811 CLASS 1 OBESITY WITH BODY MASS INDEX (BMI) OF 30.0 TO 30.9 IN ADULT: Status: RESOLVED | Noted: 2024-05-22 | Resolved: 2024-05-22

## 2024-05-22 PROBLEM — E66.9 CLASS 1 OBESITY WITH BODY MASS INDEX (BMI) OF 30.0 TO 30.9 IN ADULT: Status: ACTIVE | Noted: 2024-05-22

## 2024-05-22 PROBLEM — R07.89 CHEST HEAVINESS: Status: ACTIVE | Noted: 2024-05-22

## 2024-05-22 PROBLEM — E66.811 CLASS 1 OBESITY WITH BODY MASS INDEX (BMI) OF 30.0 TO 30.9 IN ADULT: Status: ACTIVE | Noted: 2024-05-22

## 2024-05-22 PROBLEM — E78.2 MIXED DYSLIPIDEMIA: Status: ACTIVE | Noted: 2024-05-22

## 2024-05-22 PROBLEM — N28.9 RENAL INSUFFICIENCY: Status: ACTIVE | Noted: 2024-05-22

## 2024-05-22 RX ORDER — AMLODIPINE BESYLATE 10 MG/1
10 TABLET ORAL DAILY
COMMUNITY

## 2024-05-22 RX ORDER — TRAZODONE HYDROCHLORIDE 100 MG/1
100 TABLET ORAL
COMMUNITY

## 2024-05-22 RX ORDER — FENOFIBRATE 145 MG/1
145 TABLET, FILM COATED ORAL DAILY
COMMUNITY
Start: 2022-08-18

## 2024-05-22 RX ORDER — CARVEDILOL 12.5 MG/1
12.5 TABLET ORAL
COMMUNITY

## 2024-05-22 RX ORDER — NAPROXEN SODIUM 220 MG/1
81 TABLET, FILM COATED ORAL
COMMUNITY

## 2024-06-18 ENCOUNTER — OFFICE VISIT (OUTPATIENT)
Dept: FAMILY MEDICINE CLINIC | Age: 79
End: 2024-06-18
Payer: MEDICARE

## 2024-06-18 ENCOUNTER — HOSPITAL ENCOUNTER (OUTPATIENT)
Age: 79
Setting detail: SPECIMEN
Discharge: HOME | End: 2024-06-18
Payer: MEDICARE

## 2024-06-18 VITALS
OXYGEN SATURATION: 95 % | HEIGHT: 62 IN | HEART RATE: 75 BPM | SYSTOLIC BLOOD PRESSURE: 142 MMHG | DIASTOLIC BLOOD PRESSURE: 82 MMHG | WEIGHT: 161.2 LBS | BODY MASS INDEX: 29.66 KG/M2

## 2024-06-18 DIAGNOSIS — G62.9 NEUROPATHY: Primary | ICD-10-CM

## 2024-06-18 DIAGNOSIS — G62.9 NEUROPATHY: ICD-10-CM

## 2024-06-18 PROCEDURE — 36415 COLL VENOUS BLD VENIPUNCTURE: CPT | Performed by: FAMILY MEDICINE

## 2024-06-18 PROCEDURE — 1123F ACP DISCUSS/DSCN MKR DOCD: CPT | Performed by: FAMILY MEDICINE

## 2024-06-18 PROCEDURE — 82607 VITAMIN B-12: CPT

## 2024-06-18 PROCEDURE — 99214 OFFICE O/P EST MOD 30 MIN: CPT | Performed by: FAMILY MEDICINE

## 2024-06-18 PROCEDURE — 3077F SYST BP >= 140 MM HG: CPT | Performed by: FAMILY MEDICINE

## 2024-06-18 PROCEDURE — 3079F DIAST BP 80-89 MM HG: CPT | Performed by: FAMILY MEDICINE

## 2024-06-18 PROCEDURE — 96372 THER/PROPH/DIAG INJ SC/IM: CPT | Performed by: FAMILY MEDICINE

## 2024-06-18 RX ORDER — CYANOCOBALAMIN 1000 UG/ML
1000 INJECTION, SOLUTION INTRAMUSCULAR; SUBCUTANEOUS ONCE
Status: COMPLETED | OUTPATIENT
Start: 2024-06-18 | End: 2024-06-18

## 2024-06-18 RX ORDER — ORAL SEMAGLUTIDE 3 MG/1
1 TABLET ORAL DAILY
COMMUNITY
Start: 2024-06-12

## 2024-06-18 RX ADMIN — CYANOCOBALAMIN 1000 MCG: 1000 INJECTION, SOLUTION INTRAMUSCULAR; SUBCUTANEOUS at 15:15

## 2024-06-18 SDOH — ECONOMIC STABILITY: FOOD INSECURITY: WITHIN THE PAST 12 MONTHS, THE FOOD YOU BOUGHT JUST DIDN'T LAST AND YOU DIDN'T HAVE MONEY TO GET MORE.: NEVER TRUE

## 2024-06-18 SDOH — ECONOMIC STABILITY: FOOD INSECURITY: WITHIN THE PAST 12 MONTHS, YOU WORRIED THAT YOUR FOOD WOULD RUN OUT BEFORE YOU GOT MONEY TO BUY MORE.: NEVER TRUE

## 2024-06-18 SDOH — ECONOMIC STABILITY: INCOME INSECURITY: HOW HARD IS IT FOR YOU TO PAY FOR THE VERY BASICS LIKE FOOD, HOUSING, MEDICAL CARE, AND HEATING?: NOT HARD AT ALL

## 2024-06-18 ASSESSMENT — ENCOUNTER SYMPTOMS
RHINORRHEA: 0
SORE THROAT: 0
CONSTIPATION: 0
ABDOMINAL PAIN: 0
COUGH: 0
WHEEZING: 0
SHORTNESS OF BREATH: 0
DIARRHEA: 0

## 2024-06-18 ASSESSMENT — PATIENT HEALTH QUESTIONNAIRE - PHQ9: DEPRESSION UNABLE TO ASSESS: URGENT/EMERGENT SITUATION

## 2024-06-18 NOTE — PROGRESS NOTES
[facility] PRIMARY CARE  [facility] [address]  [facility] [address]  Dept: [phone]  Dept Fax: [phone]     Chief Complaint:  Chief Complaint   Patient presents with    Numbness     Bilateral feet numbness and bilateral hand numbness x several months. Januvia causing numbness, Jardiance cause yeast infections, Ozempic made her feel like a zombie       Vitals:    06/18/24 1450   BP: (!) 142/82   Pulse: 75   SpO2: 95%   Weight: 73.1 kg (161 lb 3.2 oz)   Height: 1.575 m (5' 2\")       HPI:  78 y.o.female who presents for the following:      LE problem: b/l hand and feet numbness progressive over the past 1-2mo; seeing endocrine at [facility] for the DM2; has been changing meds between januvia, jardiance, and ozempic and finally rybelsus; says has hx of neuro reaction to flagyl and seen by neuro for this and improved on B12 injections; she thinks the different diabetes meds are causing her symptoms and feels B12 shots could help this    -----------------------------------------------------------------------------    Assessment/Plan:  78 y.o. female here mainly for the following:  Neuropathy of the hands/feet  I have my doubts about this being medication induced; discussed the possibilities like DM neuropathy, low B12, nerve impingement, MS, etc  She prefers to check the B12 and get a B12 injection today  I offered EMG and neuro referral which she declined for now     Diagnosis Orders   1. Neuropathy  Vitamin B12    cyanocobalamin injection 1,000 mcg           Return if symptoms worsen or fail to improve.    Jackson Rabago MD      -----------------------------------------------------------------------------      Objective     Physical Exam:  Physical Exam  Vitals reviewed.   Constitutional:       General: She is not in acute distress.     Appearance: She is well-developed.   HENT:      Head: Normocephalic and atraumatic.   Cardiovascular:      Rate and Rhythm: Normal rate.   Pulmonary:      Effort: Pulmonary

## 2024-06-19 LAB — VITAMIN B-12: 973 PG/ML (ref 232–1245)

## 2024-08-20 NOTE — H&P
77yo. Severe right carpal tunnel syndrome with numbness thumb index and middle fingers and weakness dropping items for carpal tunnel decompression. May need staged surgery left CTS after ultrasound, weakness right apb.    Recent acdf for cervical myelopathy stenosis.    Pmh:  Dm  Htn  Dyslipidemia    Psh:  Acdf    Sh:  Neg tobacco, etoh, substance use    Allergies:  Augmentin  Flagyl  Sulfa  Tylenol codeine    Meds:  Percocet  Amlodipine 10mg qd  Carvedilol 12.5mg bid  cyanocobalamin 100mcg qd  Fenofibrate 145mg qd  Insulin glargine 10 units qhs  Lisinopril 10mg qd  Pravastatin 40mg qhs  Pyridoxine 100mg qd  Trazodone 50mg qhs           This note was created using voice recognition software and was not corrected for typographical or grammatical errors and may have unintended errors         I spent 60 minutes with the patient and sister with over 50% for direct face to face counseling and coordination of care. Risks, benefits and alternatives to surgery were discussed:         Risks including bleeding, infection, temporary or permanent numbness, tingling, weakness, paralysis,  no pain improvement or worsening of pain, future staged surgery, seizure, stroke, blindness, heart attack, blood clot in legs, pulmonary embolism, coma, death, or other. Risk of recurrent or residual symptoms and scar tissue formation which cannot be improved by further surgery.           Benefit to include neurologic stabilization to that there is no further loss of function but without promise or guarantee of any improvement as any function already lost may be irreversible from longstanding injury or an injury that was too severe to allow recovery.         The goal of surgery would be to prevent you from becoming any worse and losing even more neurological function, such as weakness, numbness or worse pain.         Alternatives to be exhausted prior to surgery may include: medication, therapy, interventional pain management, chiropractic,

## 2024-08-30 ENCOUNTER — HOSPITAL ENCOUNTER (OUTPATIENT)
Age: 79
Setting detail: OUTPATIENT SURGERY
Discharge: HOME | End: 2024-08-30
Attending: NEUROLOGICAL SURGERY | Admitting: NEUROLOGICAL SURGERY
Payer: MEDICARE

## 2024-08-30 ENCOUNTER — ANESTHESIA (OUTPATIENT)
Dept: OPERATING ROOM | Age: 79
End: 2024-08-30
Payer: MEDICARE

## 2024-08-30 ENCOUNTER — ANESTHESIA EVENT (OUTPATIENT)
Dept: OPERATING ROOM | Age: 79
End: 2024-08-30
Payer: MEDICARE

## 2024-08-30 VITALS
RESPIRATION RATE: 18 BRPM | TEMPERATURE: 98.2 F | HEART RATE: 78 BPM | BODY MASS INDEX: 29.81 KG/M2 | SYSTOLIC BLOOD PRESSURE: 145 MMHG | OXYGEN SATURATION: 97 % | HEIGHT: 62 IN | WEIGHT: 162 LBS | DIASTOLIC BLOOD PRESSURE: 79 MMHG

## 2024-08-30 DIAGNOSIS — I10 PRIMARY HYPERTENSION: ICD-10-CM

## 2024-08-30 LAB
CHP ED QC CHECK: NORMAL
GLUCOSE BLD-MCNC: 147 MG/DL
GLUCOSE BLD-MCNC: 147 MG/DL (ref 70–99)
PERFORMED ON: ABNORMAL

## 2024-08-30 PROCEDURE — 2580000003 HC RX 258: Performed by: NEUROLOGICAL SURGERY

## 2024-08-30 PROCEDURE — 2500000003 HC RX 250 WO HCPCS: Performed by: NURSE ANESTHETIST, CERTIFIED REGISTERED

## 2024-08-30 PROCEDURE — 7100000011 HC PHASE II RECOVERY - ADDTL 15 MIN: Performed by: NEUROLOGICAL SURGERY

## 2024-08-30 PROCEDURE — 3700000001 HC ADD 15 MINUTES (ANESTHESIA): Performed by: NEUROLOGICAL SURGERY

## 2024-08-30 PROCEDURE — 3600000002 HC SURGERY LEVEL 2 BASE: Performed by: NEUROLOGICAL SURGERY

## 2024-08-30 PROCEDURE — A4217 STERILE WATER/SALINE, 500 ML: HCPCS | Performed by: NEUROLOGICAL SURGERY

## 2024-08-30 PROCEDURE — 2580000003 HC RX 258: Performed by: ANESTHESIOLOGY

## 2024-08-30 PROCEDURE — 7100000010 HC PHASE II RECOVERY - FIRST 15 MIN: Performed by: NEUROLOGICAL SURGERY

## 2024-08-30 PROCEDURE — 6360000002 HC RX W HCPCS: Performed by: NURSE ANESTHETIST, CERTIFIED REGISTERED

## 2024-08-30 PROCEDURE — C1713 ANCHOR/SCREW BN/BN,TIS/BN: HCPCS | Performed by: NEUROLOGICAL SURGERY

## 2024-08-30 PROCEDURE — 3600000012 HC SURGERY LEVEL 2 ADDTL 15MIN: Performed by: NEUROLOGICAL SURGERY

## 2024-08-30 PROCEDURE — 6360000002 HC RX W HCPCS: Performed by: NEUROLOGICAL SURGERY

## 2024-08-30 PROCEDURE — 2500000003 HC RX 250 WO HCPCS: Performed by: NEUROLOGICAL SURGERY

## 2024-08-30 PROCEDURE — 3700000000 HC ANESTHESIA ATTENDED CARE: Performed by: NEUROLOGICAL SURGERY

## 2024-08-30 PROCEDURE — 2709999900 HC NON-CHARGEABLE SUPPLY: Performed by: NEUROLOGICAL SURGERY

## 2024-08-30 PROCEDURE — 6370000000 HC RX 637 (ALT 250 FOR IP): Performed by: NEUROLOGICAL SURGERY

## 2024-08-30 RX ORDER — SODIUM CHLORIDE 0.9 % (FLUSH) 0.9 %
5-40 SYRINGE (ML) INJECTION PRN
Status: DISCONTINUED | OUTPATIENT
Start: 2024-08-30 | End: 2024-08-30 | Stop reason: HOSPADM

## 2024-08-30 RX ORDER — LIDOCAINE HYDROCHLORIDE 10 MG/ML
INJECTION, SOLUTION EPIDURAL; INFILTRATION; INTRACAUDAL; PERINEURAL PRN
Status: DISCONTINUED | OUTPATIENT
Start: 2024-08-30 | End: 2024-08-30 | Stop reason: SDUPTHER

## 2024-08-30 RX ORDER — SODIUM CHLORIDE 9 MG/ML
INJECTION, SOLUTION INTRAVENOUS CONTINUOUS
Status: DISCONTINUED | OUTPATIENT
Start: 2024-08-30 | End: 2024-08-30 | Stop reason: HOSPADM

## 2024-08-30 RX ORDER — SODIUM CHLORIDE 9 MG/ML
INJECTION, SOLUTION INTRAVENOUS PRN
Status: DISCONTINUED | OUTPATIENT
Start: 2024-08-30 | End: 2024-08-30 | Stop reason: HOSPADM

## 2024-08-30 RX ORDER — FENTANYL CITRATE 50 UG/ML
INJECTION, SOLUTION INTRAMUSCULAR; INTRAVENOUS PRN
Status: DISCONTINUED | OUTPATIENT
Start: 2024-08-30 | End: 2024-08-30 | Stop reason: SDUPTHER

## 2024-08-30 RX ORDER — NALOXONE HYDROCHLORIDE 0.4 MG/ML
INJECTION, SOLUTION INTRAMUSCULAR; INTRAVENOUS; SUBCUTANEOUS PRN
Status: DISCONTINUED | OUTPATIENT
Start: 2024-08-30 | End: 2024-08-30 | Stop reason: HOSPADM

## 2024-08-30 RX ORDER — ONDANSETRON 2 MG/ML
4 INJECTION INTRAMUSCULAR; INTRAVENOUS
Status: DISCONTINUED | OUTPATIENT
Start: 2024-08-30 | End: 2024-08-30 | Stop reason: HOSPADM

## 2024-08-30 RX ORDER — METOCLOPRAMIDE HYDROCHLORIDE 5 MG/ML
10 INJECTION INTRAMUSCULAR; INTRAVENOUS
Status: DISCONTINUED | OUTPATIENT
Start: 2024-08-30 | End: 2024-08-30 | Stop reason: HOSPADM

## 2024-08-30 RX ORDER — SODIUM CHLORIDE 0.9 % (FLUSH) 0.9 %
5-40 SYRINGE (ML) INJECTION EVERY 12 HOURS SCHEDULED
Status: DISCONTINUED | OUTPATIENT
Start: 2024-08-30 | End: 2024-08-30 | Stop reason: HOSPADM

## 2024-08-30 RX ORDER — GINSENG 100 MG
CAPSULE ORAL PRN
Status: DISCONTINUED | OUTPATIENT
Start: 2024-08-30 | End: 2024-08-30 | Stop reason: ALTCHOICE

## 2024-08-30 RX ORDER — CEPHALEXIN 500 MG/1
500 CAPSULE ORAL 3 TIMES DAILY
Qty: 3 CAPSULE | Refills: 0 | Status: SHIPPED | OUTPATIENT
Start: 2024-08-30 | End: 2024-08-31

## 2024-08-30 RX ORDER — MAGNESIUM HYDROXIDE 1200 MG/15ML
LIQUID ORAL CONTINUOUS PRN
Status: COMPLETED | OUTPATIENT
Start: 2024-08-30 | End: 2024-08-30

## 2024-08-30 RX ORDER — DIPHENHYDRAMINE HYDROCHLORIDE 50 MG/ML
12.5 INJECTION INTRAMUSCULAR; INTRAVENOUS
Status: DISCONTINUED | OUTPATIENT
Start: 2024-08-30 | End: 2024-08-30 | Stop reason: HOSPADM

## 2024-08-30 RX ORDER — PROPOFOL 10 MG/ML
INJECTION, EMULSION INTRAVENOUS PRN
Status: DISCONTINUED | OUTPATIENT
Start: 2024-08-30 | End: 2024-08-30 | Stop reason: SDUPTHER

## 2024-08-30 RX ORDER — AMLODIPINE BESYLATE 10 MG/1
10 TABLET ORAL DAILY
Qty: 90 TABLET | Refills: 2 | Status: SHIPPED | OUTPATIENT
Start: 2024-08-30

## 2024-08-30 RX ORDER — FENTANYL CITRATE 0.05 MG/ML
25 INJECTION, SOLUTION INTRAMUSCULAR; INTRAVENOUS EVERY 10 MIN PRN
Status: DISCONTINUED | OUTPATIENT
Start: 2024-08-30 | End: 2024-08-30 | Stop reason: HOSPADM

## 2024-08-30 RX ORDER — BUPIVACAINE HYDROCHLORIDE 5 MG/ML
INJECTION, SOLUTION EPIDURAL; INTRACAUDAL PRN
Status: DISCONTINUED | OUTPATIENT
Start: 2024-08-30 | End: 2024-08-30 | Stop reason: ALTCHOICE

## 2024-08-30 RX ORDER — LIDOCAINE HYDROCHLORIDE 10 MG/ML
INJECTION, SOLUTION EPIDURAL; INFILTRATION; INTRACAUDAL; PERINEURAL PRN
Status: DISCONTINUED | OUTPATIENT
Start: 2024-08-30 | End: 2024-08-30 | Stop reason: ALTCHOICE

## 2024-08-30 RX ORDER — MEPERIDINE HYDROCHLORIDE 25 MG/ML
12.5 INJECTION INTRAMUSCULAR; INTRAVENOUS; SUBCUTANEOUS
Status: DISCONTINUED | OUTPATIENT
Start: 2024-08-30 | End: 2024-08-30 | Stop reason: HOSPADM

## 2024-08-30 RX ORDER — LIDOCAINE HYDROCHLORIDE 10 MG/ML
1 INJECTION, SOLUTION EPIDURAL; INFILTRATION; INTRACAUDAL; PERINEURAL
Status: DISCONTINUED | OUTPATIENT
Start: 2024-08-30 | End: 2024-08-30 | Stop reason: HOSPADM

## 2024-08-30 RX ORDER — EPHEDRINE SULFATE 50 MG/ML
INJECTION, SOLUTION INTRAVENOUS PRN
Status: DISCONTINUED | OUTPATIENT
Start: 2024-08-30 | End: 2024-08-30 | Stop reason: SDUPTHER

## 2024-08-30 RX ORDER — OXYCODONE HYDROCHLORIDE 5 MG/1
5 TABLET ORAL
Status: DISCONTINUED | OUTPATIENT
Start: 2024-08-30 | End: 2024-08-30 | Stop reason: HOSPADM

## 2024-08-30 RX ADMIN — FENTANYL CITRATE 50 MCG: 50 INJECTION INTRAMUSCULAR; INTRAVENOUS at 07:50

## 2024-08-30 RX ADMIN — SODIUM CHLORIDE 1000 ML: 9 INJECTION, SOLUTION INTRAVENOUS at 06:55

## 2024-08-30 RX ADMIN — PROPOFOL 50 MG: 10 INJECTION, EMULSION INTRAVENOUS at 07:32

## 2024-08-30 RX ADMIN — SODIUM CHLORIDE: 9 INJECTION, SOLUTION INTRAVENOUS at 07:28

## 2024-08-30 RX ADMIN — PROPOFOL 100 MCG/KG/MIN: 10 INJECTION, EMULSION INTRAVENOUS at 07:33

## 2024-08-30 RX ADMIN — FENTANYL CITRATE 50 MCG: 50 INJECTION INTRAMUSCULAR; INTRAVENOUS at 07:57

## 2024-08-30 RX ADMIN — EPHEDRINE SULFATE 10 MG: 50 INJECTION, SOLUTION INTRAVENOUS at 08:07

## 2024-08-30 RX ADMIN — LIDOCAINE HYDROCHLORIDE 25 MG: 10 INJECTION, SOLUTION EPIDURAL; INFILTRATION; INTRACAUDAL; PERINEURAL at 07:32

## 2024-08-30 RX ADMIN — EPHEDRINE SULFATE 10 MG: 50 INJECTION, SOLUTION INTRAVENOUS at 08:02

## 2024-08-30 RX ADMIN — CEFAZOLIN 2000 MG: 2 INJECTION, POWDER, FOR SOLUTION INTRAMUSCULAR; INTRAVENOUS at 07:35

## 2024-08-30 RX ADMIN — EPHEDRINE SULFATE 5 MG: 50 INJECTION, SOLUTION INTRAVENOUS at 08:12

## 2024-08-30 ASSESSMENT — PAIN - FUNCTIONAL ASSESSMENT: PAIN_FUNCTIONAL_ASSESSMENT: 0-10

## 2024-08-30 ASSESSMENT — PAIN SCALES - GENERAL: PAINLEVEL_OUTOF10: 0

## 2024-08-30 ASSESSMENT — PAIN DESCRIPTION - DESCRIPTORS: DESCRIPTORS: NUMBNESS;TINGLING;STABBING

## 2024-08-30 NOTE — DISCHARGE INSTRUCTIONS
Follow up in 2 weeks.  Keep incision dry clean, and wear sling for 48hrs.  [facility]  Outpatient Discharge Instructions    To continue your care at home, please follow the instructions below and any additional discharge instructions given to you by your physician.    GENERAL ANESTHESIA:  Do not drive or operate machinery for 24hrs after discharge,  Do not drink alcohol, take tranquilizers, sleeping medication, or any other medication not directly instructed by your physician,   Do not make any important decisions or sign any legal documents for 24hrs after surgery,  Have someone with you for 24hrs after surgery to assist you as needed.    ACTIVITY:  Light activity for 24hrs,  No heavy lifting or exercise until instructed by your physician,  You may resume normal activities once instructed by your physician,  Special Instruction: ___________________________________________________________________    FLUIDS AND DIET:  An upset stomach or feeling sick (nausea) can commonly occur after surgery and/or pain medication use. To help minimize nausea:  Do not eat a heavy meal soon after your surgery,    Start with water or other clear liquids,  Advance to mild or bland items like Jell-O, dry toast, crackers, etc.,  Avoid caffeine,  Do not drink alcohol for at least 24 hours after surgery,  Your physician may prescribe anti-nausea medication if your nausea continues,  If you are free from nausea for 24hrs, you can advance to your normal diet as tolerated.    OPERATIVE SITE:  A small amount of bleeding or drainage after surgery is normal. Your physician will provide you with specific instructions on how to care for your surgical site and/or dressing.   Try not to touch your surgical site unless necessary,   Always wash your hands BEFORE and AFTER changing your dressing if instructed by your physician,   Proper handwashing includes wetting your hands with clean water, applying soap, lathering your hands by rubbing  drainage, puss, and/or odor from surgical site,  Pain not relieved by medications ordered  Unable to urinate

## 2024-08-30 NOTE — ANESTHESIA PRE PROCEDURE
Department of Anesthesiology  Preprocedure Note       Name:  Randee Anderson   Age:  78 y.o.  :  1945                                          MRN:  [id number]         Date:  2024      Surgeon: Surgeon(s):  Darrius Pierce MD    Procedure: Procedure(s):  RIGHT CARPAL TUNNEL DECOMPRESSION, OPERATIVE MICRODISSECTION MICROSCOPE MAC    Medications prior to admission:   Prior to Admission medications    Medication Sig Start Date End Date Taking? Authorizing Provider   amLODIPine (NORVASC) 10 MG tablet Take 1 tablet by mouth daily 23  Yes Jackson Rabago MD   traZODone (DESYREL) 100 MG tablet TAKE 1 TABLET BY MOUTH NIGHTLY  Patient taking differently: Take 0.5 tablets by mouth nightly Take 1 tablet by mouth nightly 23  Yes Jackson Rabago MD   Continuous Blood Gluc Sensor (FREESTYLE MARCO 2 SENSOR) MISC Apply new sensor every fourteen (14) days to upper arm. 23  Yes Kojo Whittington MD   lisinopril (PRINIVIL;ZESTRIL) 10 MG tablet Take 1 tablet by mouth daily 5/15/23  Yes Kojo Whittington MD   Lancets MISC 1 each by Does not apply route 3 times daily 3/14/23  Yes Jackson Rabago MD   Accu-Chek FastClix Lancets MISC test THREE TIMES DAILY 3/10/23  Yes Jackson Rabago MD   cyanocobalamin 100 MCG tablet Take 1 tablet by mouth daily   Yes Kojo Whittington MD   carvedilol (COREG) 12.5 MG tablet TAKE 1 TABLET Twice daily 22  Yes Kojo Whittington MD   blood glucose monitor kit and supplies Use daily 22  Yes Jackson Rabago MD   blood glucose monitor strips Use daily 22  Yes Jackson Rabago MD   fenofibrate (TRICOR) 145 MG tablet TAKE 1 TABLET DAILY 20  Yes Kojo Whittington MD   Lancet Device MISC 1 Device by Does not apply route 3 times daily Test 4x/day. 17  Yes Jackson Rabago MD   Blood Glucose Monitoring Suppl DONTRELL Use TID; also needs supplies for machine 17  Yes Jackson Rabago MD   aspirin 81 MG tablet Take 1 tablet by mouth daily   Yes  Provider, MD Kojo   pravastatin (PRAVACHOL) 40 MG tablet Take 1 tablet by mouth daily   Yes Provider, MD Kojo       Current medications:    Current Facility-Administered Medications   Medication Dose Route Frequency Provider Last Rate Last Admin   • 0.9 % sodium chloride infusion   IntraVENous Continuous Ruben Nino  mL/hr at 08/30/24 0727 New Bag at 08/30/24 0728   • lidocaine PF 1 % injection 1 mL  1 mL IntraDERmal Once PRN Ruben Nino MD       • sodium chloride flush 0.9 % injection 5-40 mL  5-40 mL IntraVENous 2 times per day Ruben Nino MD       • sodium chloride flush 0.9 % injection 5-40 mL  5-40 mL IntraVENous PRN Ruben Nino MD       • 0.9 % sodium chloride infusion   IntraVENous PRN Ruben Nino MD         Facility-Administered Medications Ordered in Other Encounters   Medication Dose Route Frequency Provider Last Rate Last Admin   • propofol infusion   IntraVENous PRN MichaelSay child, APRN - CRNA 44.1 mL/hr at 08/30/24 0733 100 mcg/kg/min at 08/30/24 0733   • lidocaine PF 1 % injection   IntraVENous PRN MichaelAdriánen H, APRN - CRNA   25 mg at 08/30/24 0732       Allergies:    Allergies   Allergen Reactions   • Acetaminophen-Codeine Other (See Comments)     Chest pain   • Atorvastatin Other (See Comments)   • Clonidine Other (See Comments)   • Empagliflozin Other (See Comments)     Yeast Infection   • Metronidazole      Numb hands, feet when receiving IV   • Sulfa Antibiotics Hives       Problem List:    Patient Active Problem List   Diagnosis Code   • Type 2 diabetes mellitus with microalbuminuria ([facility]) E11.29, R80.9   • Acid reflux K21.9   • Hyperlipidemia E78.5   • Spinal stenosis M48.00   • Hypertension I10       Past Medical History:        Diagnosis Date   • Acid reflux    • Arthritis    • Chronic kidney disease    • Hyperlipidemia    • Hypertension    • Spinal stenosis    • Type II or unspecified type diabetes mellitus without mention of

## 2024-08-30 NOTE — ANESTHESIA POSTPROCEDURE EVALUATION
Department of Anesthesiology  Postprocedure Note    Patient: Randee Anderson  MRN: [id number]  YOB: 1945  Date of evaluation: 8/30/2024    Procedure Summary       Date: 08/30/24 Room / Location: [address]    Anesthesia Start: 0727 Anesthesia Stop: 0829    Procedure: RIGHT CARPAL TUNNEL DECOMPRESSION OPERATIVE MICRODISSECTION (Right: Wrist) Diagnosis:       Other specified mononeuropathies of bilateral upper limbs      (Other specified mononeuropathies of bilateral upper limbs [G56.83])    Surgeons: Darrius Pierce MD Responsible Provider: Ruben Nino MD    Anesthesia Type: MAC ASA Status: 3            Anesthesia Type: No value filed.    Derrick Phase I: Derrick Score: 10    Derrick Phase II:      Anesthesia Post Evaluation    Patient location during evaluation: bedside  Patient participation: complete - patient participated  Level of consciousness: awake  Airway patency: positional obstruction  Nausea & Vomiting: no nausea and no vomiting  Cardiovascular status: hemodynamically stable  Respiratory status: acceptable  Hydration status: euvolemic  Pain management: adequate        No notable events documented.

## 2024-08-30 NOTE — OP NOTE
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]    Post-Op Diagnosis: Same       Procedure(s):  RIGHT CARPAL TUNNEL DECOMPRESSION OPERATIVE MICRODISSECTION    Surgeon(s):  Darrius Pierce MD    Assistant:   First Assistant: Amairani Maki; Anni Vuong    Anesthesia: Monitor Anesthesia Care    Estimated Blood Loss (mL): Minimal    Complications: None    Specimens:   * No specimens in log *    Implants:  * No implants in log *      Drains: * No LDAs found *    Findings:  Infection Present At Time Of Surgery (PATOS) (choose all levels that have infection present):  No infection present  Other Findings:     Detailed Description of Procedure:   Risks including bleeding, infection, temporary or permanent numbness, tingling, weakness, paralysis, bowel or bladder dysfunction, leaking of cerebrospinal fluid leading to meningitis, no pain improvement or worsening of pain, future staged surgery, seizure, stroke, blindness, heart attack, blood clot in legs, pulmonary embolism, coma, death, or other. Risk of recurrent or residual symptoms and scar tissue formation which cannot be improved by further surgery.     Benefit to include neurologic stabilization to that there is no further loss of function but without promise or guarantee of any improvement as any function already lost may be irreversible from longstanding injury or an injury that was too severe to allow recovery.  The goal of surgery would be to prevent you from becoming any worse and losing even more neurological function, such as weakness, numbness or worse pain.     Alternatives to be exhausted prior to surgery may include: medication, therapy, interventional pain management, chiropractic, acupuncture, massage therapy.     Operative procedure:  Patient was brought to the operating room at  [facility].   Timeout procedure performed.  Patient was intubated by anesthesia service, under monitored anesthesia care (MAC), monitoring lines were placed, preoperative antibiotics were given.   Patient was placed in a supine position meticulous padding all pressure points to prevent pressure sores.  Patient prepped and draped in the usual sterile manner as well as operative microscope.   Operative microdissection and microtechnique required and used throughout the case.   Local anesthesia was infiltrated and sharp skin incision was made.   No epinephrine was used in local anesthetic.  Decompression of the right median nerve at the wrist through the palmar aponeurosis and transverse carpal ligament.  Full decompression of the median nerve proximal to the distal wrist crease and distal to the palmar fat was performed.     Copious antibiotic irrigation applied and meticulous hemostasis was achieved.  10 minute of pressure held manually.  All sponge needle instrument counts were correct multiple times.  Multilayer closure was performed and sterile dressing was applied.  Patient placed into sling for 48hrs.  Patient was extubated and transferred to recovery room in stable condition.     This note was created using voice recognition software and was not corrected for typographical or grammatical errors and may have unintended errors    Electronically signed by RIKI HARGROVE MD on 8/30/2024 at 8:27 AM

## 2024-08-30 NOTE — TELEPHONE ENCOUNTER
Comments:     Last Office Visit (last PCP visit):   6/18/2024    Next Visit Date:  No future appointments.    **If hasn't been seen in over a year OR hasn't followed up according to last diabetes/ADHD visit, make appointment for patient before sending refill to provider.    Rx requested:  Requested Prescriptions     Pending Prescriptions Disp Refills    amLODIPine (NORVASC) 10 MG tablet [Pharmacy Med Name: amlodipine 10 mg tablet] 90 tablet 2     Sig: Take 1 tablet by mouth daily

## 2024-09-02 DIAGNOSIS — F51.01 PRIMARY INSOMNIA: ICD-10-CM

## 2024-09-02 RX ORDER — TRAZODONE HYDROCHLORIDE 100 MG/1
TABLET ORAL
Qty: 30 TABLET | Refills: 11 | Status: SHIPPED | OUTPATIENT
Start: 2024-09-02

## 2024-09-25 ENCOUNTER — APPOINTMENT (OUTPATIENT)
Dept: CARDIOLOGY | Facility: CLINIC | Age: 79
End: 2024-09-25

## 2024-10-03 DIAGNOSIS — E78.2 MIXED DYSLIPIDEMIA: ICD-10-CM

## 2024-10-03 NOTE — TELEPHONE ENCOUNTER
Received request for prescription refill for patient.  Patient follows with Dr. Zack Dominguez MD     Request is for pravastatin and fenofibrate  Is patient currently on medication- Yes    Last OV- 9/22/23  Next OV- not scheduled at this time    ANGELIQUE Christianson CLERICAL- please schedule the patient for follow up and route to Amy Chaney LPN for refill completion.

## 2024-10-03 NOTE — TELEPHONE ENCOUNTER
Called and spoke to patient. She said she is having a hard time with her hands and legs, currently has little use of her legs. Her son has appointment on 10/10 with  at 1. We made her an appointment for 1:45pm on 10/10 so she can come in with her son.

## 2024-10-04 RX ORDER — PRAVASTATIN SODIUM 40 MG/1
40 TABLET ORAL NIGHTLY
Qty: 30 TABLET | Refills: 0 | Status: SHIPPED | OUTPATIENT
Start: 2024-10-04 | End: 2024-11-03

## 2024-10-04 RX ORDER — FENOFIBRATE 145 MG/1
145 TABLET, FILM COATED ORAL DAILY
Qty: 30 TABLET | Refills: 0 | Status: SHIPPED | OUTPATIENT
Start: 2024-10-04 | End: 2024-11-03

## 2024-10-10 ENCOUNTER — APPOINTMENT (OUTPATIENT)
Dept: CARDIOLOGY | Facility: CLINIC | Age: 79
End: 2024-10-10
Payer: MEDICARE

## 2024-10-10 VITALS
SYSTOLIC BLOOD PRESSURE: 154 MMHG | BODY MASS INDEX: 30.11 KG/M2 | DIASTOLIC BLOOD PRESSURE: 80 MMHG | HEART RATE: 72 BPM | WEIGHT: 162 LBS

## 2024-10-10 DIAGNOSIS — Z78.9 NEVER SMOKED ANY SUBSTANCE: ICD-10-CM

## 2024-10-10 DIAGNOSIS — E11.9 TYPE 2 DIABETES MELLITUS WITHOUT COMPLICATION, WITHOUT LONG-TERM CURRENT USE OF INSULIN (MULTI): ICD-10-CM

## 2024-10-10 DIAGNOSIS — N18.32 STAGE 3B CHRONIC KIDNEY DISEASE (MULTI): ICD-10-CM

## 2024-10-10 DIAGNOSIS — I10 BENIGN ESSENTIAL HYPERTENSION: ICD-10-CM

## 2024-10-10 DIAGNOSIS — M48.061 SPINAL STENOSIS OF LUMBAR REGION, UNSPECIFIED WHETHER NEUROGENIC CLAUDICATION PRESENT: ICD-10-CM

## 2024-10-10 DIAGNOSIS — E78.2 MIXED DYSLIPIDEMIA: ICD-10-CM

## 2024-10-10 PROBLEM — E66.3 OVERWEIGHT WITH BODY MASS INDEX (BMI) OF 28 TO 28.9 IN ADULT: Status: RESOLVED | Noted: 2024-05-22 | Resolved: 2024-10-10

## 2024-10-10 PROBLEM — E66.9 OBESE: Status: RESOLVED | Noted: 2024-05-22 | Resolved: 2024-10-10

## 2024-10-10 PROBLEM — M48.00 SPINAL STENOSIS: Status: ACTIVE | Noted: 2024-10-10

## 2024-10-10 PROCEDURE — 3079F DIAST BP 80-89 MM HG: CPT | Performed by: INTERNAL MEDICINE

## 2024-10-10 PROCEDURE — 3077F SYST BP >= 140 MM HG: CPT | Performed by: INTERNAL MEDICINE

## 2024-10-10 PROCEDURE — 99214 OFFICE O/P EST MOD 30 MIN: CPT | Performed by: INTERNAL MEDICINE

## 2024-10-10 PROCEDURE — 1036F TOBACCO NON-USER: CPT | Performed by: INTERNAL MEDICINE

## 2024-10-10 PROCEDURE — 1159F MED LIST DOCD IN RCRD: CPT | Performed by: INTERNAL MEDICINE

## 2024-10-10 RX ORDER — LISINOPRIL 10 MG/1
1 TABLET ORAL DAILY
COMMUNITY
Start: 2023-05-08

## 2024-10-10 RX ORDER — INSULIN GLARGINE 100 [IU]/ML
10 INJECTION, SOLUTION SUBCUTANEOUS
COMMUNITY
Start: 2024-08-01

## 2024-10-10 RX ORDER — PNV NO.95/FERROUS FUM/FOLIC AC 28MG-0.8MG
1 TABLET ORAL DAILY
COMMUNITY
Start: 2023-05-08

## 2024-10-10 RX ORDER — GABAPENTIN 100 MG/1
CAPSULE ORAL
COMMUNITY
Start: 2024-08-01

## 2024-10-10 RX ORDER — FUROSEMIDE 40 MG/1
40 TABLET ORAL
COMMUNITY
Start: 2024-10-07

## 2024-10-10 RX ORDER — FLASH GLUCOSE SENSOR
KIT MISCELLANEOUS
COMMUNITY
Start: 2024-10-06

## 2024-10-10 ASSESSMENT — ENCOUNTER SYMPTOMS
BACK PAIN: 1
JOINT SWELLING: 1

## 2024-10-10 NOTE — PROGRESS NOTES
CARDIOLOGY OFFICE VISIT      CHIEF COMPLAINT      HISTORY OF PRESENT ILLNESS  The patient states she has not been doing well for the last couple months at least.  She had surgery on her cervical spine.  She also had carpal tunnel surgery on 1 hand.  She did not have any issues from a cardiac standpoint with any of those procedures.  She states that she cannot walk.  She states that she cannot use her hands well.  She is undergoing some sort of autoimmune medication infusions at the cancer center in [place].  She is not having any ischemic type chest discomfort.  She denies any significant dyspnea.  She denies palpitations syncope.  She has been having a little bit of swelling of her ankles bilaterally.  She was placed on Lasix by her nephrologist in [place] for this.  I told her I believe the swelling is most likely due to chronic venous insufficiency of the lower extremities.  Since she cannot walk she is sitting a lot.    IMPRESSION:   1. Essential hypertension.  2. Mixed hyperlipidemia.  3. Obese  4. Diabetes Mellitus   5. Spinal Stenosis, severe chronic compression L1  6. CKD III, GFR 38. [facility] provider   7. Auto Immune work up currently under investigation, Cancer Center in [place]  8.  Probable chronic venous insufficiency of lower extremities bilaterally     Please excuse any errors in grammar or translation related to this dictation. Voice recognition software was utilized to prepare this document.     Past Medical History  History reviewed. No pertinent past medical history.    Social History  Social History     Tobacco Use    Smoking status: Never    Smokeless tobacco: Never   Substance Use Topics    Alcohol use: Never    Drug use: Never       Family History     Family History   Problem Relation Name Age of Onset    Other (diabetes mellitus) Mother      Other (Stroke syndrome) Mother      Other (diabetes mellitus) Sister      Other (diabetes mellitus) Brother      Other (myocardial infarction)  Brother      Kidney failure Brother      Other (CABG (coronary artery bypass surgery)) Brother      Other (PTCA) Brother          Allergies:  Allergies   Allergen Reactions    Amoxicillin-Pot Clavulanate Unknown    Atorvastatin Myalgia    Clonidine Hallucinations    Empagliflozin Other     Yeast Infection    Metronidazole Unknown    Sulfamethoxazole Unknown    Acetaminophen-Codeine Palpitations, GI Upset and Other     Chest pain        Outpatient Medications:  Current Outpatient Medications   Medication Instructions    amLODIPine (NORVASC) 10 mg, oral, Daily    aspirin 81 mg, oral, Daily RT    CALCIUM CITRATE-VITAMIN D3 ORAL oral    carvedilol (COREG) 12.5 mg, oral, 2 times daily (morning and late afternoon)    cyanocobalamin (Vitamin B-12) 100 mcg tablet 1 tablet, oral, Daily    fenofibrate (TRICOR) 145 mg, oral, Daily    FreeStyle [place] 2 Sensor kit Apply new sensor every fourteen (14) days to upper arm.    furosemide (LASIX) 40 mg, oral    gabapentin (Neurontin) 100 mg capsule Take 1 capsule by mouth every 12 hours for 30 days.    insulin glargine (LANTUS) 10 Units, subcutaneous    lisinopril 10 mg tablet 1 tablet, oral, Daily    pravastatin (PRAVACHOL) 40 mg, oral, Nightly, Take 1 tablet at bedtime    traZODone (DESYREL) 100 mg, TAKE 1/2 tablet PO HS          REVIEW OF SYSTEMS  Review of Systems   Constitutional:        Wheelchair for assistance    Musculoskeletal:  Positive for arthritis, back pain, joint pain and joint swelling.   All other systems reviewed and are negative.        VITALS  Vitals:    10/10/24 1315   BP: 154/80   Pulse: 72       PHYSICAL EXAM  Vitals reviewed.   Constitutional:       Appearance: Normal and healthy appearance. Well-developed and not in distress.   Eyes:      Conjunctiva/sclera: Conjunctivae normal.      Pupils: Pupils are equal, round, and reactive to light.   Neck:      Vascular: No JVR. JVD normal.   Pulmonary:      Effort: Pulmonary effort is normal.      Breath sounds:  Normal breath sounds. No wheezing. No rhonchi. No rales.   Chest:      Chest wall: Not tender to palpatation.   Cardiovascular:      PMI at left midclavicular line. Normal rate. Regular rhythm. Normal S1. Normal S2.       Murmurs: There is no murmur.      No gallop.  No click. No rub.   Pulses:     Intact distal pulses.   Edema:     Peripheral edema absent.   Abdominal:      Tenderness: There is no abdominal tenderness.   Musculoskeletal: Normal range of motion.         General: No tenderness.      Cervical back: Normal range of motion. Skin:     General: Skin is warm and dry.   Neurological:      General: No focal deficit present.      Mental Status: Alert and oriented to person, place and time.   Psychiatric:         Behavior: Behavior is cooperative.           ASSESSMENT AND PLAN  Diagnoses and all orders for this visit:  Benign essential hypertension  Mixed dyslipidemia  Type 2 diabetes mellitus without complication, without long-term current use of insulin (Multi)  BMI 30.0-30.9,adult  Stage 3b chronic kidney disease (Multi)  Spinal stenosis of lumbar region, unspecified whether neurogenic claudication present  Never smoked any substance      [unfilled]

## 2024-10-10 NOTE — PATIENT INSTRUCTIONS
Patient to follow up in 1 year with Dr. Zack Guerra MD      No changes today.   Continue same medications and treatments.   Patient educated on proper medication use.   Patient educated on risk factor modification.   Please bring any lab results from other providers / physicians to your next appointment.     Please bring all medicines, vitamins, and herbal supplements with you when you come to the office.     Prescriptions will not be filled unless you are compliant with your follow up appointments or have a follow up appointment scheduled as per instruction of your physician. Refills should be requested at the time of your visit.    I, Mike Brannon RN am scribing for and in the presence of Dr. Zack Dominguez MD

## 2024-10-23 ENCOUNTER — APPOINTMENT (OUTPATIENT)
Dept: CARDIOLOGY | Facility: CLINIC | Age: 79
End: 2024-10-23
Payer: MEDICARE

## 2024-11-14 ENCOUNTER — TELEPHONE (OUTPATIENT)
Dept: FAMILY MEDICINE CLINIC | Age: 79
End: 2024-11-14

## 2024-11-14 NOTE — TELEPHONE ENCOUNTER
It actually appears the form does not need a signature and that the system is available without a prescription. Form faxed to company who will contact patient for delivery.

## 2024-11-14 NOTE — TELEPHONE ENCOUNTER
Patient is requesting a prescription for a PureWick external catheter system. Patient has very limited mobility after getting out of the rehab facility and is requiring 2 people to transfer to a potty chair while up and the use of a bed pan while in bed. Patient would like the prescription sent to [facility].

## 2024-11-16 DIAGNOSIS — E78.2 MIXED DYSLIPIDEMIA: ICD-10-CM

## 2024-11-18 RX ORDER — FENOFIBRATE 145 MG/1
145 TABLET, FILM COATED ORAL DAILY
Qty: 30 TABLET | Refills: 0 | Status: SHIPPED | OUTPATIENT
Start: 2024-11-18 | End: 2024-12-18

## 2024-11-18 NOTE — TELEPHONE ENCOUNTER
Received request for prescription refills for patient.   Patient follows with Dr. Dominguez    Request is for Fenofribrate   Is patient currently on medication yes    Last OV 10/10/2024  Next OV 10/16/2025    Pended for signing and sent to provider

## 2024-11-25 DIAGNOSIS — I10 BENIGN ESSENTIAL HYPERTENSION: ICD-10-CM

## 2024-11-25 RX ORDER — CARVEDILOL 12.5 MG/1
12.5 TABLET ORAL
Qty: 180 TABLET | Refills: 3 | Status: SHIPPED | OUTPATIENT
Start: 2024-11-25 | End: 2025-11-25

## 2024-11-25 NOTE — TELEPHONE ENCOUNTER
Received request for prescription refills for patient.   Patient follows with Dr. Zack Dominguez    Request is for Carvedilol 12.5 mg  Is patient currently on medication yes    Last OV 10/10/24  Next OV 10/16/25    Pended for signing and sent to provider

## 2024-12-01 DIAGNOSIS — E78.2 MIXED DYSLIPIDEMIA: ICD-10-CM

## 2024-12-02 RX ORDER — PRAVASTATIN SODIUM 40 MG/1
40 TABLET ORAL NIGHTLY
Qty: 90 TABLET | Refills: 3 | Status: SHIPPED | OUTPATIENT
Start: 2024-12-02

## 2024-12-02 NOTE — TELEPHONE ENCOUNTER
Received request for prescription refills for patient.   Patient follows with Dr. Dominguez    Request is for Pravachol  Is patient currently on medication yes    Last OV 10/10/24  Next OV 10/16/25    Pended for signing and sent to provider

## 2024-12-08 DIAGNOSIS — E78.2 MIXED DYSLIPIDEMIA: ICD-10-CM

## 2024-12-09 RX ORDER — FENOFIBRATE 145 MG/1
145 TABLET, FILM COATED ORAL DAILY
Qty: 90 TABLET | Refills: 3 | Status: SHIPPED | OUTPATIENT
Start: 2024-12-09

## 2024-12-09 NOTE — TELEPHONE ENCOUNTER
Received request for prescription refills for patient.   Patient follows with Dr. Dominguez    Request is for Tricor  Is patient currently on medication yes    Last OV 10/10/24  Next OV 10/16/25    Pended for signing and sent to provider

## 2025-04-16 ENCOUNTER — OFFICE VISIT (OUTPATIENT)
Dept: FAMILY MEDICINE CLINIC | Age: 80
End: 2025-04-16
Payer: MEDICARE

## 2025-04-16 VITALS
SYSTOLIC BLOOD PRESSURE: 126 MMHG | HEIGHT: 62 IN | RESPIRATION RATE: 14 BRPM | OXYGEN SATURATION: 95 % | HEART RATE: 73 BPM | BODY MASS INDEX: 29.63 KG/M2 | TEMPERATURE: 97.6 F | DIASTOLIC BLOOD PRESSURE: 72 MMHG

## 2025-04-16 DIAGNOSIS — Z91.81 AT RISK FOR FALLS: ICD-10-CM

## 2025-04-16 DIAGNOSIS — J01.40 ACUTE NON-RECURRENT PANSINUSITIS: ICD-10-CM

## 2025-04-16 DIAGNOSIS — Z91.81 AT HIGH RISK FOR FALLS: ICD-10-CM

## 2025-04-16 DIAGNOSIS — J22 LOWER RESPIRATORY INFECTION: Primary | ICD-10-CM

## 2025-04-16 PROCEDURE — 3078F DIAST BP <80 MM HG: CPT | Performed by: NURSE PRACTITIONER

## 2025-04-16 PROCEDURE — 99213 OFFICE O/P EST LOW 20 MIN: CPT | Performed by: NURSE PRACTITIONER

## 2025-04-16 PROCEDURE — 1123F ACP DISCUSS/DSCN MKR DOCD: CPT | Performed by: NURSE PRACTITIONER

## 2025-04-16 PROCEDURE — 3074F SYST BP LT 130 MM HG: CPT | Performed by: NURSE PRACTITIONER

## 2025-04-16 PROCEDURE — 1159F MED LIST DOCD IN RCRD: CPT | Performed by: NURSE PRACTITIONER

## 2025-04-16 RX ORDER — GABAPENTIN 100 MG/1
100 CAPSULE ORAL 3 TIMES DAILY
COMMUNITY

## 2025-04-16 RX ORDER — AZITHROMYCIN 250 MG/1
TABLET, FILM COATED ORAL
Qty: 6 TABLET | Refills: 0 | Status: SHIPPED | OUTPATIENT
Start: 2025-04-16 | End: 2025-04-21

## 2025-04-16 RX ORDER — LORATADINE 10 MG/1
10 TABLET ORAL DAILY
Qty: 30 TABLET | Refills: 0 | Status: SHIPPED | OUTPATIENT
Start: 2025-04-16

## 2025-04-16 RX ORDER — FLUTICASONE PROPIONATE 50 MCG
SPRAY, SUSPENSION (ML) NASAL
Qty: 1 EACH | Refills: 1 | Status: SHIPPED | OUTPATIENT
Start: 2025-04-16

## 2025-04-16 SDOH — ECONOMIC STABILITY: FOOD INSECURITY: WITHIN THE PAST 12 MONTHS, THE FOOD YOU BOUGHT JUST DIDN'T LAST AND YOU DIDN'T HAVE MONEY TO GET MORE.: NEVER TRUE

## 2025-04-16 SDOH — ECONOMIC STABILITY: FOOD INSECURITY: WITHIN THE PAST 12 MONTHS, YOU WORRIED THAT YOUR FOOD WOULD RUN OUT BEFORE YOU GOT MONEY TO BUY MORE.: NEVER TRUE

## 2025-04-16 ASSESSMENT — PATIENT HEALTH QUESTIONNAIRE - PHQ9
SUM OF ALL RESPONSES TO PHQ QUESTIONS 1-9: 0
2. FEELING DOWN, DEPRESSED OR HOPELESS: NOT AT ALL
1. LITTLE INTEREST OR PLEASURE IN DOING THINGS: NOT AT ALL
SUM OF ALL RESPONSES TO PHQ QUESTIONS 1-9: 0

## 2025-04-16 ASSESSMENT — ENCOUNTER SYMPTOMS
SINUS PRESSURE: 1
COUGH: 1
EYE REDNESS: 0
WHEEZING: 0
EYE ITCHING: 0
RHINORRHEA: 1
EYE DISCHARGE: 0
SORE THROAT: 1
SHORTNESS OF BREATH: 0

## 2025-04-16 NOTE — PROGRESS NOTES
Randee Anderson (:  1945) is a 79 y.o. female, Established patient, here for evaluation of the following chief complaint(s):  Other (Scratchy throat, bilateral ear pain x 1.5 weeks which lead to cough x 1 week /Patient just wants to make sure lungs are clear due to taking auto immune treatments next treatment is 25/Patient declines testing)      Vitals:    25 1452   BP: 126/72   Pulse: 73   Resp: 14   Temp: 97.6 °F (36.4 °C)   SpO2: 95%       ASSESSMENT/PLAN:  1. Lower respiratory infection  -     azithromycin (ZITHROMAX) 250 MG tablet; 500 mg on day 1, then 250 mg days 2-5., Disp-6 tablet, R-0Normal  2. Acute non-recurrent pansinusitis  -     azithromycin (ZITHROMAX) 250 MG tablet; 500 mg on day 1, then 250 mg days 2-5., Disp-6 tablet, R-0Normal  -     fluticasone (FLONASE) 50 MCG/ACT nasal spray; Take 1 spray each nostril at night, Disp-1 each, R-1Normal  -     loratadine (CLARITIN) 10 MG tablet; Take 1 tablet by mouth daily, Disp-30 tablet, R-0Normal  3. At risk for falls        Return if symptoms worsen or fail to improve.      SUBJECTIVE/OBJECTIVE:    Sinusitis  This is a new problem. Episode onset: scratchy thorat, bilateral ear pain x1.5 weeks.  Productive cough x1 week. The problem has been gradually worsening since onset. There has been no fever. Her pain is at a severity of 4/10. Associated symptoms include congestion, coughing, ear pain (bilateral), sinus pressure and a sore throat. Pertinent negatives include no chills, headaches, neck pain or shortness of breath. Treatments tried: robitussin. The treatment provided no relief.       Review of Systems   Constitutional:  Negative for chills, fatigue and fever.   HENT:  Positive for congestion, ear pain (bilateral), postnasal drip, rhinorrhea, sinus pressure and sore throat.    Eyes:  Negative for discharge, redness and itching.   Respiratory:  Positive for cough. Negative for shortness of breath and wheezing.    Cardiovascular:

## 2025-06-04 DIAGNOSIS — I10 PRIMARY HYPERTENSION: ICD-10-CM

## 2025-06-04 NOTE — TELEPHONE ENCOUNTER
Comments: my chart message sent to scheduled AWV    Last Office Visit (last PCP visit):   6/18/2024    Next Visit Date:  No future appointments.    **If hasn't been seen in over a year OR hasn't followed up according to last diabetes/ADHD visit, make appointment for patient before sending refill to provider.    Rx requested:  Requested Prescriptions     Pending Prescriptions Disp Refills    amLODIPine (NORVASC) 10 MG tablet [Pharmacy Med Name: amlodipine 10 mg tablet] 90 tablet 2     Sig: Take 1 tablet by mouth daily

## 2025-06-06 RX ORDER — AMLODIPINE BESYLATE 10 MG/1
10 TABLET ORAL DAILY
Qty: 90 TABLET | Refills: 0 | Status: SHIPPED | OUTPATIENT
Start: 2025-06-06

## 2025-06-10 ENCOUNTER — OFFICE VISIT (OUTPATIENT)
Dept: FAMILY MEDICINE CLINIC | Age: 80
End: 2025-06-10
Payer: MEDICARE

## 2025-06-10 VITALS
SYSTOLIC BLOOD PRESSURE: 124 MMHG | WEIGHT: 178.8 LBS | BODY MASS INDEX: 32.9 KG/M2 | DIASTOLIC BLOOD PRESSURE: 84 MMHG | HEART RATE: 79 BPM | HEIGHT: 62 IN | OXYGEN SATURATION: 96 %

## 2025-06-10 DIAGNOSIS — E11.29 TYPE 2 DIABETES MELLITUS WITH MICROALBUMINURIA (HCC): ICD-10-CM

## 2025-06-10 DIAGNOSIS — F51.01 PRIMARY INSOMNIA: ICD-10-CM

## 2025-06-10 DIAGNOSIS — Z00.00 MEDICARE ANNUAL WELLNESS VISIT, SUBSEQUENT: Primary | ICD-10-CM

## 2025-06-10 DIAGNOSIS — R80.9 TYPE 2 DIABETES MELLITUS WITH MICROALBUMINURIA (HCC): ICD-10-CM

## 2025-06-10 DIAGNOSIS — M48.00 SPINAL STENOSIS, UNSPECIFIED SPINAL REGION: ICD-10-CM

## 2025-06-10 DIAGNOSIS — I10 PRIMARY HYPERTENSION: ICD-10-CM

## 2025-06-10 PROCEDURE — 1159F MED LIST DOCD IN RCRD: CPT | Performed by: FAMILY MEDICINE

## 2025-06-10 PROCEDURE — G0439 PPPS, SUBSEQ VISIT: HCPCS | Performed by: FAMILY MEDICINE

## 2025-06-10 PROCEDURE — 99213 OFFICE O/P EST LOW 20 MIN: CPT | Performed by: FAMILY MEDICINE

## 2025-06-10 PROCEDURE — 1123F ACP DISCUSS/DSCN MKR DOCD: CPT | Performed by: FAMILY MEDICINE

## 2025-06-10 PROCEDURE — 3074F SYST BP LT 130 MM HG: CPT | Performed by: FAMILY MEDICINE

## 2025-06-10 PROCEDURE — 3079F DIAST BP 80-89 MM HG: CPT | Performed by: FAMILY MEDICINE

## 2025-06-10 RX ORDER — AMLODIPINE BESYLATE 10 MG/1
10 TABLET ORAL DAILY
Qty: 90 TABLET | Refills: 3 | Status: SHIPPED | OUTPATIENT
Start: 2025-06-10

## 2025-06-10 RX ORDER — TRAZODONE HYDROCHLORIDE 100 MG/1
TABLET ORAL
Qty: 30 TABLET | Refills: 11 | Status: SHIPPED | OUTPATIENT
Start: 2025-06-10

## 2025-06-10 ASSESSMENT — PATIENT HEALTH QUESTIONNAIRE - PHQ9
1. LITTLE INTEREST OR PLEASURE IN DOING THINGS: NOT AT ALL
SUM OF ALL RESPONSES TO PHQ QUESTIONS 1-9: 0
2. FEELING DOWN, DEPRESSED OR HOPELESS: NOT AT ALL
SUM OF ALL RESPONSES TO PHQ QUESTIONS 1-9: 0

## 2025-06-10 NOTE — PROGRESS NOTES
Obtained consent for student.  
Year: Never true   Transportation Needs: No Transportation Needs (4/16/2025)    PRAPARE - Transportation     Lack of Transportation (Medical): No     Lack of Transportation (Non-Medical): No   Physical Activity: Inactive (6/10/2025)    Exercise Vital Sign     Days of Exercise per Week: 0 days     Minutes of Exercise per Session: 0 min   Stress: No Stress Concern Present (11/8/2024)    Received from [facility]    [employer] - Occupational Stress Questionnaire     Feeling of Stress : Only a little   Social Connections: Moderately Integrated (10/31/2024)    Received from [facility]    Social Connection and Isolation Panel [NHANES]     Frequency of Communication with Friends and Family: Three times a week     Frequency of Social Gatherings with Friends and Family: More than three times a week     Attends Temple Services: More than 4 times per year     Active Member of Clubs or Organizations: No     Attends Club or Organization Meetings: Never     Marital Status:    Intimate Partner Violence: Not At Risk (10/30/2024)    Received from [facility]    Domestic Abuse Assessment     Do you feel safe in your relationships at home?: Yes     Physical Abuse: Denies     [facility] Domestic Abuse - Type of Abuse: Not on file     [facility] Domestic Abuse - Time Frame: Not on file     [facility] Domestic Abuse - Signs and Symptoms: Not on file     Verbal Abuse: Denies     [facility] Domestic Abuse - Reported To: Not on file   Housing Stability: Low Risk  (4/16/2025)    Housing Stability Vital Sign     Unable to Pay for Housing in the Last Year: No     Number of Times Moved in the Last Year: 0     Homeless in the Last Year: No     Family History   Problem Relation Age of Onset    Diabetes Mother     Heart Disease Mother     Stroke Mother     Breast Cancer Maternal Aunt       Allergies   Allergen Reactions    Acetaminophen-Codeine Other (See Comments)     Chest pain    Atorvastatin Other (See Comments)

## 2025-10-16 ENCOUNTER — APPOINTMENT (OUTPATIENT)
Dept: CARDIOLOGY | Facility: CLINIC | Age: 80
End: 2025-10-16
Payer: MEDICARE

## (undated) DEVICE — BANDAGE,GAUZE,BULKEE II,4.5"X4.1YD,STRL: Brand: MEDLINE

## (undated) DEVICE — GAUZE,SPONGE,FLUFF,6"X6.75",STRL,10/TRAY: Brand: MEDLINE

## (undated) DEVICE — TUBING, SUCTION, 9/32" X 12', STRAIGHT: Brand: MEDLINE INDUSTRIES, INC.

## (undated) DEVICE — SUTURE ETHILON SZ 3-0 L18IN NONABSORBABLE BLK PS-2 L19MM 3/8 1669H

## (undated) DEVICE — HAND: Brand: MEDLINE INDUSTRIES, INC.

## (undated) DEVICE — Device

## (undated) DEVICE — GOWN,AURORA,NONREINFORCED,LARGE: Brand: MEDLINE

## (undated) DEVICE — FLOSEAL WITH RECOTHROM - 10ML.: Brand: FLOSEAL HEMOSTATIC MATRIX

## (undated) DEVICE — SYRINGE IRRIG 60ML SFT PLIABLE BLB EZ TO GRP 1 HND USE W/

## (undated) DEVICE — BANDAGE COMPR W3INXL5YD WHT BGE POLY COT M E WRP WV HK AND

## (undated) DEVICE — 3M™ IOBAN™ 2 ANTIMICROBIAL INCISE DRAPE 6650EZ: Brand: IOBAN™ 2

## (undated) DEVICE — X-LARGE COTTON GLOVE: Brand: DEROYAL

## (undated) DEVICE — GLOVE ORANGE PI 7 1/2   MSG9075

## (undated) DEVICE — TOWEL,OR,DSP,ST,BLUE,STD,4/PK,20PK/CS: Brand: MEDLINE

## (undated) DEVICE — GLOVE ORANGE PI 7   MSG9070